# Patient Record
Sex: FEMALE | Race: WHITE | NOT HISPANIC OR LATINO | Employment: FULL TIME | URBAN - METROPOLITAN AREA
[De-identification: names, ages, dates, MRNs, and addresses within clinical notes are randomized per-mention and may not be internally consistent; named-entity substitution may affect disease eponyms.]

---

## 2017-06-16 ENCOUNTER — HOSPITAL ENCOUNTER (OUTPATIENT)
Dept: MAMMOGRAPHY | Facility: HOSPITAL | Age: 57
Discharge: HOME | End: 2017-06-16
Attending: FAMILY MEDICINE

## 2017-06-16 DIAGNOSIS — Z12.31 VISIT FOR SCREENING MAMMOGRAM: ICD-10-CM

## 2017-07-17 ENCOUNTER — OFFICE VISIT - HEALTHEAST (OUTPATIENT)
Dept: FAMILY MEDICINE | Facility: CLINIC | Age: 57
End: 2017-07-17

## 2017-07-17 ENCOUNTER — COMMUNICATION - HEALTHEAST (OUTPATIENT)
Dept: FAMILY MEDICINE | Facility: CLINIC | Age: 57
End: 2017-07-17

## 2017-07-17 DIAGNOSIS — M72.2 PLANTAR FASCIITIS, BILATERAL: ICD-10-CM

## 2017-07-17 DIAGNOSIS — S39.012S LUMBAR STRAIN, SEQUELA: ICD-10-CM

## 2017-07-17 DIAGNOSIS — I83.813 VARICOSE VEINS OF BILATERAL LOWER EXTREMITIES WITH PAIN: ICD-10-CM

## 2017-07-17 DIAGNOSIS — E55.9 VITAMIN D DEFICIENCY: ICD-10-CM

## 2017-07-17 DIAGNOSIS — Z00.00 WELL ADULT EXAM: ICD-10-CM

## 2017-07-17 DIAGNOSIS — E66.9 OBESITY (BMI 30-39.9): ICD-10-CM

## 2017-07-17 LAB
CHOLEST SERPL-MCNC: 188 MG/DL
FASTING STATUS PATIENT QL REPORTED: YES
HDLC SERPL-MCNC: 58 MG/DL
LDLC SERPL CALC-MCNC: 112 MG/DL
TRIGL SERPL-MCNC: 91 MG/DL

## 2017-07-17 ASSESSMENT — MIFFLIN-ST. JEOR: SCORE: 1383.28

## 2017-07-24 ENCOUNTER — COMMUNICATION - HEALTHEAST (OUTPATIENT)
Dept: FAMILY MEDICINE | Facility: CLINIC | Age: 57
End: 2017-07-24

## 2017-07-24 ENCOUNTER — OFFICE VISIT - HEALTHEAST (OUTPATIENT)
Dept: FAMILY MEDICINE | Facility: CLINIC | Age: 57
End: 2017-07-24

## 2017-07-24 ENCOUNTER — COMMUNICATION - HEALTHEAST (OUTPATIENT)
Dept: TELEHEALTH | Facility: CLINIC | Age: 57
End: 2017-07-24

## 2017-07-24 DIAGNOSIS — L03.115 CELLULITIS OF RIGHT THIGH: ICD-10-CM

## 2017-07-24 DIAGNOSIS — T63.441A BEE STING: ICD-10-CM

## 2018-06-28 ENCOUNTER — COMMUNICATION - HEALTHEAST (OUTPATIENT)
Dept: SCHEDULING | Facility: CLINIC | Age: 58
End: 2018-06-28

## 2018-06-28 ENCOUNTER — OFFICE VISIT - HEALTHEAST (OUTPATIENT)
Dept: FAMILY MEDICINE | Facility: CLINIC | Age: 58
End: 2018-06-28

## 2018-06-28 DIAGNOSIS — L03.90 CELLULITIS: ICD-10-CM

## 2018-06-29 ENCOUNTER — HOSPITAL ENCOUNTER (OUTPATIENT)
Dept: MAMMOGRAPHY | Facility: CLINIC | Age: 58
Discharge: HOME | End: 2018-06-29
Attending: FAMILY MEDICINE

## 2018-06-29 DIAGNOSIS — Z12.31 VISIT FOR SCREENING MAMMOGRAM: ICD-10-CM

## 2018-07-13 ENCOUNTER — OFFICE VISIT - HEALTHEAST (OUTPATIENT)
Dept: FAMILY MEDICINE | Facility: CLINIC | Age: 58
End: 2018-07-13

## 2018-07-13 DIAGNOSIS — M67.442 GANGLION CYST OF JOINT OF FINGER OF LEFT HAND: ICD-10-CM

## 2018-07-13 DIAGNOSIS — N32.81 OVERACTIVE BLADDER: ICD-10-CM

## 2018-07-13 DIAGNOSIS — R35.0 URINARY FREQUENCY: ICD-10-CM

## 2018-07-13 DIAGNOSIS — M19.90 OA (OSTEOARTHRITIS): ICD-10-CM

## 2018-07-13 LAB
ALBUMIN UR-MCNC: NEGATIVE MG/DL
APPEARANCE UR: CLEAR
BACTERIA #/AREA URNS HPF: ABNORMAL HPF
BILIRUB UR QL STRIP: NEGATIVE
COLOR UR AUTO: YELLOW
GLUCOSE UR STRIP-MCNC: NEGATIVE MG/DL
HGB UR QL STRIP: NEGATIVE
KETONES UR STRIP-MCNC: NEGATIVE MG/DL
LEUKOCYTE ESTERASE UR QL STRIP: ABNORMAL
NITRATE UR QL: NEGATIVE
PH UR STRIP: 7 [PH] (ref 5–8)
RBC #/AREA URNS AUTO: ABNORMAL HPF
SP GR UR STRIP: 1.01 (ref 1–1.03)
SQUAMOUS #/AREA URNS AUTO: ABNORMAL LPF
UROBILINOGEN UR STRIP-ACNC: ABNORMAL
WBC #/AREA URNS AUTO: ABNORMAL HPF

## 2018-07-13 RX ORDER — NAPROXEN 500 MG/1
500 TABLET ORAL 2 TIMES DAILY PRN
Qty: 60 TABLET | Refills: 4 | Status: SHIPPED | OUTPATIENT
Start: 2018-07-13 | End: 2022-10-17

## 2018-07-13 ASSESSMENT — MIFFLIN-ST. JEOR: SCORE: 1479.96

## 2018-07-14 LAB — BACTERIA SPEC CULT: NO GROWTH

## 2018-07-30 ENCOUNTER — RECORDS - HEALTHEAST (OUTPATIENT)
Dept: ADMINISTRATIVE | Facility: OTHER | Age: 58
End: 2018-07-30

## 2018-08-06 ENCOUNTER — OFFICE VISIT - HEALTHEAST (OUTPATIENT)
Dept: FAMILY MEDICINE | Facility: CLINIC | Age: 58
End: 2018-08-06

## 2018-08-06 DIAGNOSIS — M67.442 GANGLION CYST OF JOINT OF FINGER OF LEFT HAND: ICD-10-CM

## 2018-08-06 DIAGNOSIS — I83.93 VARICOSE VEINS OF BOTH LOWER EXTREMITIES: ICD-10-CM

## 2018-08-06 DIAGNOSIS — E66.811 OBESITY (BMI 30.0-34.9): ICD-10-CM

## 2018-08-06 DIAGNOSIS — R53.83 FATIGUE: ICD-10-CM

## 2018-08-06 DIAGNOSIS — Z00.00 HEALTH CARE MAINTENANCE: ICD-10-CM

## 2018-08-06 DIAGNOSIS — N32.81 OVERACTIVE BLADDER: ICD-10-CM

## 2018-08-06 LAB
ALBUMIN SERPL-MCNC: 3.9 G/DL (ref 3.5–5)
ALP SERPL-CCNC: 76 U/L (ref 45–120)
ALT SERPL W P-5'-P-CCNC: 17 U/L (ref 0–45)
ANION GAP SERPL CALCULATED.3IONS-SCNC: 9 MMOL/L (ref 5–18)
AST SERPL W P-5'-P-CCNC: 23 U/L (ref 0–40)
BILIRUB SERPL-MCNC: 1.3 MG/DL (ref 0–1)
BUN SERPL-MCNC: 22 MG/DL (ref 8–22)
CALCIUM SERPL-MCNC: 9.4 MG/DL (ref 8.5–10.5)
CHLORIDE BLD-SCNC: 105 MMOL/L (ref 98–107)
CHOLEST SERPL-MCNC: 204 MG/DL
CO2 SERPL-SCNC: 27 MMOL/L (ref 22–31)
CREAT SERPL-MCNC: 0.76 MG/DL (ref 0.6–1.1)
ERYTHROCYTE [DISTWIDTH] IN BLOOD BY AUTOMATED COUNT: 12.9 % (ref 11–14.5)
FASTING STATUS PATIENT QL REPORTED: YES
GFR SERPL CREATININE-BSD FRML MDRD: >60 ML/MIN/1.73M2
GLUCOSE BLD-MCNC: 86 MG/DL (ref 70–125)
HCT VFR BLD AUTO: 39.1 % (ref 35–47)
HDLC SERPL-MCNC: 61 MG/DL
HGB BLD-MCNC: 13.7 G/DL (ref 12–16)
LDLC SERPL CALC-MCNC: 128 MG/DL
MCH RBC QN AUTO: 30.6 PG (ref 27–34)
MCHC RBC AUTO-ENTMCNC: 35.1 G/DL (ref 32–36)
MCV RBC AUTO: 87 FL (ref 80–100)
PLATELET # BLD AUTO: 194 THOU/UL (ref 140–440)
PMV BLD AUTO: 8.5 FL (ref 7–10)
POTASSIUM BLD-SCNC: 4.7 MMOL/L (ref 3.5–5)
PROT SERPL-MCNC: 7 G/DL (ref 6–8)
RBC # BLD AUTO: 4.48 MILL/UL (ref 3.8–5.4)
SODIUM SERPL-SCNC: 141 MMOL/L (ref 136–145)
TRIGL SERPL-MCNC: 74 MG/DL
WBC: 4.7 THOU/UL (ref 4–11)

## 2018-08-06 ASSESSMENT — MIFFLIN-ST. JEOR: SCORE: 1506.04

## 2018-08-30 ENCOUNTER — RECORDS - HEALTHEAST (OUTPATIENT)
Dept: ADMINISTRATIVE | Facility: OTHER | Age: 58
End: 2018-08-30

## 2019-06-17 ENCOUNTER — OFFICE VISIT - HEALTHEAST (OUTPATIENT)
Dept: FAMILY MEDICINE | Facility: CLINIC | Age: 59
End: 2019-06-17

## 2019-06-17 DIAGNOSIS — B07.0 PLANTAR WARTS: ICD-10-CM

## 2019-06-17 DIAGNOSIS — I83.813 VARICOSE VEINS OF BOTH LOWER EXTREMITIES WITH PAIN: ICD-10-CM

## 2019-06-17 ASSESSMENT — MIFFLIN-ST. JEOR: SCORE: 1580.31

## 2019-07-08 ENCOUNTER — COMMUNICATION - HEALTHEAST (OUTPATIENT)
Dept: VASCULAR SURGERY | Facility: CLINIC | Age: 59
End: 2019-07-08

## 2019-07-09 ENCOUNTER — OFFICE VISIT - HEALTHEAST (OUTPATIENT)
Dept: VASCULAR SURGERY | Facility: CLINIC | Age: 59
End: 2019-07-09

## 2019-07-09 ENCOUNTER — RECORDS - HEALTHEAST (OUTPATIENT)
Dept: VASCULAR ULTRASOUND | Facility: CLINIC | Age: 59
End: 2019-07-09

## 2019-07-09 DIAGNOSIS — I87.2 VENOUS INSUFFICIENCY OF BOTH LOWER EXTREMITIES: ICD-10-CM

## 2019-07-09 DIAGNOSIS — I87.2 VENOUS INSUFFICIENCY (CHRONIC) (PERIPHERAL): ICD-10-CM

## 2019-07-09 DIAGNOSIS — I83.893 SYMPTOMATIC VARICOSE VEINS OF BOTH LOWER EXTREMITIES: ICD-10-CM

## 2019-07-09 DIAGNOSIS — I83.893 VARICOSE VEINS OF BILATERAL LOWER EXTREMITIES WITH OTHER COMPLICATIONS: ICD-10-CM

## 2019-07-09 ASSESSMENT — MIFFLIN-ST. JEOR: SCORE: 1544.3

## 2019-07-31 ENCOUNTER — COMMUNICATION - HEALTHEAST (OUTPATIENT)
Dept: FAMILY MEDICINE | Facility: CLINIC | Age: 59
End: 2019-07-31

## 2019-07-31 DIAGNOSIS — N32.81 OVERACTIVE BLADDER: ICD-10-CM

## 2019-08-22 ENCOUNTER — HOSPITAL ENCOUNTER (OUTPATIENT)
Dept: MAMMOGRAPHY | Facility: CLINIC | Age: 59
Discharge: HOME | End: 2019-08-22
Attending: FAMILY MEDICINE

## 2019-08-22 DIAGNOSIS — Z12.31 VISIT FOR SCREENING MAMMOGRAM: ICD-10-CM

## 2019-08-23 ENCOUNTER — OFFICE VISIT - HEALTHEAST (OUTPATIENT)
Dept: FAMILY MEDICINE | Facility: CLINIC | Age: 59
End: 2019-08-23

## 2019-08-23 DIAGNOSIS — E66.09 CLASS 2 OBESITY DUE TO EXCESS CALORIES WITHOUT SERIOUS COMORBIDITY WITH BODY MASS INDEX (BMI) OF 36.0 TO 36.9 IN ADULT: ICD-10-CM

## 2019-08-23 DIAGNOSIS — Z00.00 ROUTINE GENERAL MEDICAL EXAMINATION AT A HEALTH CARE FACILITY: ICD-10-CM

## 2019-08-23 DIAGNOSIS — Z11.4 ENCOUNTER FOR SCREENING FOR HIV: ICD-10-CM

## 2019-08-23 DIAGNOSIS — N32.81 OVERACTIVE BLADDER: ICD-10-CM

## 2019-08-23 DIAGNOSIS — Z11.59 NEED FOR HEPATITIS C SCREENING TEST: ICD-10-CM

## 2019-08-23 DIAGNOSIS — E66.812 CLASS 2 OBESITY DUE TO EXCESS CALORIES WITHOUT SERIOUS COMORBIDITY WITH BODY MASS INDEX (BMI) OF 36.0 TO 36.9 IN ADULT: ICD-10-CM

## 2019-08-23 LAB
CHOLEST SERPL-MCNC: 191 MG/DL
FASTING STATUS PATIENT QL REPORTED: YES
FASTING STATUS PATIENT QL REPORTED: YES
GLUCOSE BLD-MCNC: 96 MG/DL (ref 70–125)
HDLC SERPL-MCNC: 64 MG/DL
HGB BLD-MCNC: 15.1 G/DL (ref 12–16)
HIV 1+2 AB+HIV1 P24 AG SERPL QL IA: NEGATIVE
LDLC SERPL CALC-MCNC: 117 MG/DL
TRIGL SERPL-MCNC: 49 MG/DL
TSH SERPL DL<=0.005 MIU/L-ACNC: 1.04 UIU/ML (ref 0.3–5)

## 2019-08-23 ASSESSMENT — MIFFLIN-ST. JEOR: SCORE: 1571.81

## 2019-08-26 LAB — HCV AB SERPL QL IA: NEGATIVE

## 2019-11-26 ENCOUNTER — OFFICE VISIT - HEALTHEAST (OUTPATIENT)
Dept: VASCULAR SURGERY | Facility: CLINIC | Age: 59
End: 2019-11-26

## 2019-11-26 DIAGNOSIS — I87.2 VENOUS INSUFFICIENCY OF BOTH LOWER EXTREMITIES: ICD-10-CM

## 2019-11-26 DIAGNOSIS — I83.893 SYMPTOMATIC VARICOSE VEINS OF BOTH LOWER EXTREMITIES: ICD-10-CM

## 2019-11-26 ASSESSMENT — MIFFLIN-ST. JEOR: SCORE: 1560.19

## 2020-06-18 ENCOUNTER — COMMUNICATION - HEALTHEAST (OUTPATIENT)
Dept: SCHEDULING | Facility: CLINIC | Age: 60
End: 2020-06-18

## 2020-06-18 ENCOUNTER — OFFICE VISIT - HEALTHEAST (OUTPATIENT)
Dept: FAMILY MEDICINE | Facility: CLINIC | Age: 60
End: 2020-06-18

## 2020-06-18 ENCOUNTER — AMBULATORY - HEALTHEAST (OUTPATIENT)
Dept: VASCULAR SURGERY | Facility: CLINIC | Age: 60
End: 2020-06-18

## 2020-06-18 DIAGNOSIS — L03.011 CELLULITIS OF FINGER OF RIGHT HAND: ICD-10-CM

## 2020-08-07 ENCOUNTER — COMMUNICATION - HEALTHEAST (OUTPATIENT)
Dept: VASCULAR SURGERY | Facility: CLINIC | Age: 60
End: 2020-08-07

## 2020-08-25 ENCOUNTER — COMMUNICATION - HEALTHEAST (OUTPATIENT)
Dept: VASCULAR SURGERY | Facility: CLINIC | Age: 60
End: 2020-08-25

## 2020-09-02 ENCOUNTER — RECORDS - HEALTHEAST (OUTPATIENT)
Dept: VASCULAR ULTRASOUND | Facility: CLINIC | Age: 60
End: 2020-09-02

## 2020-09-02 DIAGNOSIS — I87.2 VENOUS INSUFFICIENCY (CHRONIC) (PERIPHERAL): ICD-10-CM

## 2020-09-02 DIAGNOSIS — I83.893 VARICOSE VEINS OF BILATERAL LOWER EXTREMITIES WITH OTHER COMPLICATIONS: ICD-10-CM

## 2020-09-02 RX ORDER — ASCORBIC ACID 500 MG
500 TABLET ORAL DAILY
Status: SHIPPED | COMMUNITY
Start: 2020-09-02

## 2020-09-03 ENCOUNTER — COMMUNICATION - HEALTHEAST (OUTPATIENT)
Dept: FAMILY MEDICINE | Facility: CLINIC | Age: 60
End: 2020-09-03

## 2020-09-04 ENCOUNTER — RECORDS - HEALTHEAST (OUTPATIENT)
Dept: VASCULAR ULTRASOUND | Facility: CLINIC | Age: 60
End: 2020-09-04

## 2020-09-04 DIAGNOSIS — I87.2 VENOUS INSUFFICIENCY (CHRONIC) (PERIPHERAL): ICD-10-CM

## 2020-09-04 DIAGNOSIS — I83.893 VARICOSE VEINS OF BILATERAL LOWER EXTREMITIES WITH OTHER COMPLICATIONS: ICD-10-CM

## 2020-09-16 ENCOUNTER — COMMUNICATION - HEALTHEAST (OUTPATIENT)
Dept: VASCULAR SURGERY | Facility: CLINIC | Age: 60
End: 2020-09-16

## 2020-09-16 ENCOUNTER — OFFICE VISIT - HEALTHEAST (OUTPATIENT)
Dept: FAMILY MEDICINE | Facility: CLINIC | Age: 60
End: 2020-09-16

## 2020-09-16 ENCOUNTER — OFFICE VISIT - HEALTHEAST (OUTPATIENT)
Dept: VASCULAR SURGERY | Facility: CLINIC | Age: 60
End: 2020-09-16

## 2020-09-16 DIAGNOSIS — E66.3 OVERWEIGHT: ICD-10-CM

## 2020-09-16 DIAGNOSIS — I83.893 SYMPTOMATIC VARICOSE VEINS OF BOTH LOWER EXTREMITIES: ICD-10-CM

## 2020-09-16 DIAGNOSIS — I10 BENIGN ESSENTIAL HYPERTENSION: ICD-10-CM

## 2020-09-16 RX ORDER — LISINOPRIL 10 MG/1
10 TABLET ORAL DAILY
Qty: 90 TABLET | Refills: 3 | Status: SHIPPED | OUTPATIENT
Start: 2020-09-16 | End: 2021-09-06

## 2020-10-05 ENCOUNTER — AMBULATORY - HEALTHEAST (OUTPATIENT)
Dept: LAB | Facility: CLINIC | Age: 60
End: 2020-10-05

## 2020-10-05 DIAGNOSIS — E66.3 OVERWEIGHT: ICD-10-CM

## 2020-10-05 DIAGNOSIS — I10 BENIGN ESSENTIAL HYPERTENSION: ICD-10-CM

## 2020-10-05 LAB
ALBUMIN SERPL-MCNC: 4 G/DL (ref 3.5–5)
ALP SERPL-CCNC: 93 U/L (ref 45–120)
ALT SERPL W P-5'-P-CCNC: 17 U/L (ref 0–45)
ANION GAP SERPL CALCULATED.3IONS-SCNC: 7 MMOL/L (ref 5–18)
AST SERPL W P-5'-P-CCNC: 22 U/L (ref 0–40)
BILIRUB SERPL-MCNC: 1.1 MG/DL (ref 0–1)
BUN SERPL-MCNC: 19 MG/DL (ref 8–22)
CALCIUM SERPL-MCNC: 9.2 MG/DL (ref 8.5–10.5)
CHLORIDE BLD-SCNC: 104 MMOL/L (ref 98–107)
CHOLEST SERPL-MCNC: 199 MG/DL
CO2 SERPL-SCNC: 29 MMOL/L (ref 22–31)
CREAT SERPL-MCNC: 0.8 MG/DL (ref 0.6–1.1)
FASTING STATUS PATIENT QL REPORTED: YES
GFR SERPL CREATININE-BSD FRML MDRD: >60 ML/MIN/1.73M2
GLUCOSE BLD-MCNC: 95 MG/DL (ref 70–125)
HDLC SERPL-MCNC: 65 MG/DL
HGB BLD-MCNC: 13.6 G/DL (ref 12–16)
LDLC SERPL CALC-MCNC: 119 MG/DL
POTASSIUM BLD-SCNC: 4.7 MMOL/L (ref 3.5–5)
PROT SERPL-MCNC: 7.2 G/DL (ref 6–8)
SODIUM SERPL-SCNC: 140 MMOL/L (ref 136–145)
TRIGL SERPL-MCNC: 73 MG/DL
TSH SERPL DL<=0.005 MIU/L-ACNC: 1.65 UIU/ML (ref 0.3–5)

## 2020-10-07 ENCOUNTER — COMMUNICATION - HEALTHEAST (OUTPATIENT)
Dept: FAMILY MEDICINE | Facility: CLINIC | Age: 60
End: 2020-10-07

## 2020-10-07 ENCOUNTER — RECORDS - HEALTHEAST (OUTPATIENT)
Dept: ADMINISTRATIVE | Facility: OTHER | Age: 60
End: 2020-10-07

## 2021-01-18 ENCOUNTER — COMMUNICATION - HEALTHEAST (OUTPATIENT)
Dept: VASCULAR SURGERY | Facility: CLINIC | Age: 61
End: 2021-01-18

## 2021-01-20 ENCOUNTER — OFFICE VISIT - HEALTHEAST (OUTPATIENT)
Dept: VASCULAR SURGERY | Facility: CLINIC | Age: 61
End: 2021-01-20

## 2021-01-20 DIAGNOSIS — I87.2 VENOUS INSUFFICIENCY OF BOTH LOWER EXTREMITIES: ICD-10-CM

## 2021-01-20 DIAGNOSIS — I83.893 SYMPTOMATIC VARICOSE VEINS OF BOTH LOWER EXTREMITIES: ICD-10-CM

## 2021-05-26 ENCOUNTER — RECORDS - HEALTHEAST (OUTPATIENT)
Dept: ADMINISTRATIVE | Facility: CLINIC | Age: 61
End: 2021-05-26

## 2021-05-27 ENCOUNTER — RECORDS - HEALTHEAST (OUTPATIENT)
Dept: ADMINISTRATIVE | Facility: CLINIC | Age: 61
End: 2021-05-27

## 2021-05-28 ENCOUNTER — RECORDS - HEALTHEAST (OUTPATIENT)
Dept: ADMINISTRATIVE | Facility: CLINIC | Age: 61
End: 2021-05-28

## 2021-05-29 NOTE — PATIENT INSTRUCTIONS - HE
Consider OTC wart remover cream once wounds heal from freezing today.    Can follow up in 4 weeks for repeat freezing if needed

## 2021-05-29 NOTE — PROGRESS NOTES
Assessment/Plan:     1. Plantar warts     2. Varicose veins of both lower extremities with pain  Ambulatory referral to Vascular Surgery       Diagnoses and all orders for this visit:    Plantar warts  Patient would like treatment of warts today with liquid nitrogen.  Discussed procedure for treatment.  Discussed risks associated with procedure including those of discomfort and infection.  Verbal consent obtained.  Warts were cleansed with alcohol swab.  Warts were pared down with a #15 scalpel.  Each wart was then frozen for 20 seconds with liquid nitrogen, allowed to fall and then frozen again for 20 seconds.  She tolerated procedure well.  Counseled on wound cares.  Bandages placed over wounds.  She will follow-up in 4 weeks for repeat treatment if needed.  Also discussed use of over-the-counter wart removing creams in interim as this may help with resolution of lesions.    Varicose veins of both lower extremities with pain  -     Ambulatory referral to Vascular Surgery         Subjective:      Shahnaz Arias is a 59 y.o. female who comes in today with concern about a wart on her left foot.  This has been present for about 3 weeks.  It is causing her pain and discomfort.  She initially thought it was a callus.  When she has had calluses in the past she will soak her feet and file them down and they will no longer be a problem.  She has been soaking her foot and filing the current lesion down but this does not relieve the discomfort.  She has had warts in the past.  We reviewed meds and allergies.  She has no other concerns or questions.  She does request a referral to the vascular center.  She has history of varicose veins of both legs.  These are causing discomfort.  In the past we have discussed that she could return to vascular center for further evaluation and treatment.  She is now ready to pursue this referral.  Review of systems otherwise negative.  No other questions today.    Current Outpatient  Medications   Medication Sig Dispense Refill     calcium carbonate-vitamin D3 (CALCIUM 600 WITH VITAMIN D3) 600 mg(1,500mg) -200 unit per tablet Take 1 tablet by mouth daily.       cholecalciferol, vitamin D3, (VITAMIN D3) 2,000 unit cap Take by mouth daily.       coenzyme Q10 (CO Q-10) 100 mg capsule Take 100 mg by mouth daily. Take as directed.       cranberry 400 mg cap Take 1 tablet by mouth daily.       omega-3 fatty acids-vitamin E (FISH OIL) 1,000 mg cap Take by mouth daily.       tolterodine (DETROL LA) 4 MG ER capsule Take 1 capsule (4 mg total) by mouth daily. 90 capsule 3     naproxen (NAPROSYN) 500 MG tablet Take 1 tablet (500 mg total) by mouth 2 (two) times a day as needed. 60 tablet 4     No current facility-administered medications for this visit.        Past Medical History, Family History, and Social History reviewed.  No past medical history on file.  Past Surgical History:   Procedure Laterality Date     HYSTERECTOMY  2002     DC TOTAL ABDOM HYSTERECTOMY      Description: Total Abdominal Hysterectomy;  Recorded: 03/28/2013;     Sulfa (sulfonamide antibiotics)  Family History   Problem Relation Age of Onset     Breast cancer Maternal Aunt      Dementia Maternal Aunt         Alzheimers     Breast cancer Maternal Aunt      Dementia Mother         Alzheimer     Dementia Maternal Aunt         Alzheimers     Social History     Socioeconomic History     Marital status:      Spouse name: Not on file     Number of children: Not on file     Years of education: Not on file     Highest education level: Not on file   Occupational History     Not on file   Social Needs     Financial resource strain: Not on file     Food insecurity:     Worry: Not on file     Inability: Not on file     Transportation needs:     Medical: Not on file     Non-medical: Not on file   Tobacco Use     Smoking status: Never Smoker     Smokeless tobacco: Never Used   Substance and Sexual Activity     Alcohol use: No     Drug use:  "Not on file     Sexual activity: Yes     Partners: Male     Birth control/protection: None   Lifestyle     Physical activity:     Days per week: Not on file     Minutes per session: Not on file     Stress: Not on file   Relationships     Social connections:     Talks on phone: Not on file     Gets together: Not on file     Attends Gnosticism service: Not on file     Active member of club or organization: Not on file     Attends meetings of clubs or organizations: Not on file     Relationship status: Not on file     Intimate partner violence:     Fear of current or ex partner: Not on file     Emotionally abused: Not on file     Physically abused: Not on file     Forced sexual activity: Not on file   Other Topics Concern     Not on file   Social History Narrative     Not on file         Review of systems is as stated in HPI, and the remainder of the 10 system review is otherwise negative.    Objective:     Vitals:    06/17/19 1459   BP: 142/70   Patient Site: Right Arm   Patient Position: Sitting   Cuff Size: Adult Large   Pulse: 81   Temp: 98.4  F (36.9  C)   TempSrc: Oral   SpO2: 99%   Weight: 221 lb 7 oz (100.4 kg)   Height: 5' 5\" (1.651 m)    Body mass index is 36.85 kg/m .    General appearance: alert, appears stated age and cooperative  Head: Normocephalic, without obvious abnormality, atraumatic  Skin: 2 plantar warts present on left foot      This note has been dictated using voice recognition software. Any grammatical or context distortions are unintentional and inherent to the the software.       "

## 2021-05-30 NOTE — PROGRESS NOTES
[facility] Vein Consult      Assessment:     1. varicose veins, bilateral   2. spider veins, bilateral   3. Insuffiencey of right acessory saphenous vein, left acessory saphenous vein       Plan:     1. Treatment options of conservative therapy of stockings use, exercise, weight loss,  elevating legs when possible.    2. Script for compression stockings 20-30 mm hg  3. Ultrasound to evaluate legs for incompetency of both deep and superficial system . Done today  4. Surgical treatment   Endovenous closure ofbilateral, acessory saphenous vein   Stab avulsions of bilateral lower extremities   Risks and benefits of surgical intervention including infection, burns, dvt, thrombophlebitis, not closing, recurrence, numbness and nerve injury and need for further intervention were all discused    5. Follow up: 3 months.   6. Call for any questions concerns or issues    Subjective:      Shahnaz Arias is a 59 y.o. female  who was referred by Cyn Garrido MD  for evaluation of varicose veins. Symptoms include pain, aching, fatigue, burning, edema and dermatitis. Patient has history of leg selling, pain and vein issues that have progressed. Pain and symptoms have affected daily living and work activities needing medications. Here for evaluation today. no stocking or compression devic use    Allergies:Sulfa (sulfonamide antibiotics)    History reviewed. No pertinent past medical history.    Past Surgical History:   Procedure Laterality Date     HYSTERECTOMY  2002     MT TOTAL ABDOM HYSTERECTOMY      Description: Total Abdominal Hysterectomy;  Recorded: 03/28/2013;     VEIN LIGATION AND STRIPPING Bilateral        Current Outpatient Medications   Medication Sig Note     calcium carbonate-vitamin D3 (CALCIUM 600 WITH VITAMIN D3) 600 mg(1,500mg) -200 unit per tablet Take 1 tablet by mouth daily.      cholecalciferol, vitamin D3, (VITAMIN D3) 2,000 unit cap Take by mouth daily.      coenzyme Q10 (CO Q-10) 100 mg capsule Take 100 mg  "by mouth daily. Take as directed.      cranberry 400 mg cap Take 1 tablet by mouth daily.      naproxen (NAPROSYN) 500 MG tablet Take 1 tablet (500 mg total) by mouth 2 (two) times a day as needed. 6/17/2019: As needed      omega-3 fatty acids-vitamin E (FISH OIL) 1,000 mg cap Take by mouth daily.      tolterodine (DETROL LA) 4 MG ER capsule Take 1 capsule (4 mg total) by mouth daily.        Family History   Problem Relation Age of Onset     Breast cancer Maternal Aunt      Dementia Maternal Aunt         Alzheimers     Breast cancer Maternal Aunt      Dementia Mother         Alzheimer     Varicose Veins Mother      Dementia Maternal Aunt         Alzheimers     Varicose Veins Sister      Varicose Veins Brother         reports that she has never smoked. She has never used smokeless tobacco. She reports that she does not drink alcohol.      Review of Systems  Pertinent items are noted in HPI.  A 12 point comprehensive review of systems was negative except as noted.      Objective:     Vitals:    07/09/19 1434   BP: 134/86   Pulse: 92   Resp: 16   Temp: 98.2  F (36.8  C)   TempSrc: Oral   Weight: 210 lb (95.3 kg)   Height: 5' 6\" (1.676 m)     Body mass index is 33.89 kg/m .    EXAM:  GENERAL: This is a well-developed 59 y.o. female who appears her stated age  HEAD: normocephalic  HEENT: Pupils equal and reactive bilaterally  MOUTH: mucus membranes intact. Normal dentation  CARDIAC: RRR without murmur  CHEST/LUNG:  Clear to auscultation bilaterally  ABDOMEN: Soft, nontender, nondistended, no masses noted   NEUROLOGIC: Focally intact, nonfocal, alert and oriented x 3  INTEGUMENT: No open lesions or ulcers  VASCULAR: Pulses intact, symmetrical upper and lower extremities. There areskin changes consistent with chronic venous insufficiency. Varicose veins present in bilateral greater saphenous distribution. Spider veins present bilateral.    Imaging:    Insuffiencey of bilateral anterior accessory saphenous veins. Stripped " or missing greater saphenous veins.   Preliminary results full to follow    US Venous Insufficiency Legs Bilateral (Order [id number])   Imaging   Date: 7/9/2019 Department: [facility] Ultrasound [place] Released By: Medhat Ron RDMS, RVT Authorizing: Hai Prater MD   Study Result     [place] [facility]     DATE: 7/9/2019     EXAM: BILATERAL LOWER EXTREMITY DEEP AND SUPERFICIAL VENOUS DUPLEX ULTRASOUND WITH PHYSIOLOGIC TESTING     INDICATION: Symptomatic varicose veins. Assess for incompetent veins.     TECHNIQUE: Supine and upright ultrasound of the deep and superficial veins with Valsalva and compression augmentation maneuvers. Duplex imaging is performed utilizing gray-scale, two-dimensional images, color-flow imaging, Doppler waveform analysis, and   spectral Doppler imaging.      INCOMPETENCY CRITERIA: Deep vein reflux reported when greater than 1,000 ms flow reversal.  Superficial vein reflux reported when greater than 500 ms flow reversal.  vein reflux reported as greater than 350 ms flow reversal.     DEEP VEIN FINDINGS:     RIGHT LEG: The common femoral, profunda femoral, femoral, popliteal, and visualized calf veins are patent and compressible.  Deep venous reflux noted within the common femoral, upper femoral, and popliteal veins.     LEFT LEG:  The common femoral, profunda femoral, femoral, popliteal, and visualized calf veins are patent and compressible.   Deep venous reflux noted within the common femoral and upper femoral veins.     RIGHT SUPERFICIAL VEIN FINDINGS:  GREAT SAPHENOUS VEIN: Nonvisualization of portions of the greater saphenous vein. Superficial venous reflux noted within the saphenofemoral junction. The vein measures 8 mm within this distribution.     SMALL SAPHENOUS VEIN: Superficial venous reflux within the small saphenous vein at the level of the proximal calf. The vein measures 5 mm within this distribution.     The anterior accessory  saphenous vein is incompetent measuring 8 mm in diameter. A cluster of varicose veins is noted within the right thigh measuring 6 mm in diameter with reflux noted.     LEFT SUPERFICIAL VEIN FINDINGS:  GREAT SAPHENOUS VEIN: Portions of the greater saphenous vein are not well visualized. Superficial venous reflux at the level the saphenofemoral junction. The vein measures 6 mm within this distribution.     SMALL SAPHENOUS VEIN: Superficial venous reflux within the small saphenous vein at the level the proximal calf. The vein measures 5 mm within this distribution.     The anterior accessory saphenous vein is incompetent measuring 4-6 mm. A varicose vein branch measures approximately 7 mm in diameter and is incompetent, located within the proximal thigh.     IMPRESSION:   CONCLUSION:   1.  No deep venous thrombosis of either lower extremity.  2.  RIGHT LEG: Deep and superficial venous reflux, as described.  3.  LEFT LEG: Deep and superficial venous reflux, as described.         Hai Prater MD  [facility] Dept.

## 2021-05-30 NOTE — PROGRESS NOTES
This is a new consult for Varicose Veins. The patient has varicose veins that are problematic in bilateral legs. Symptoms patient has been experiencing are heaviness and aching. Pain rated 8 out of 10. Patient has not been wearing compression stockings. Patient has not had recent imaging on legs done. Hx of varicose vein stripping done bilaterally around 1990.

## 2021-05-31 VITALS — HEIGHT: 65 IN | BODY MASS INDEX: 29.66 KG/M2 | WEIGHT: 178 LBS

## 2021-05-31 VITALS — WEIGHT: 180.5 LBS | BODY MASS INDEX: 30.04 KG/M2

## 2021-05-31 NOTE — TELEPHONE ENCOUNTER
RN cannot approve Refill Request    RN can NOT refill this medication med is not covered by policy/route to provider. Last office visit: 6/17/2019 Cyn Garrido MD Last Physical: 8/6/2018 Last MTM visit: Visit date not found Last visit same specialty: 6/17/2019 Cyn Garrido MD.  Next visit within 3 mo: Visit date not found  Next physical within 3 mo: Visit date not found      Nely Jefferson, [facility] Triage/Med Refill 7/31/2019    Requested Prescriptions   Pending Prescriptions Disp Refills     tolterodine (DETROL LA) 4 MG ER capsule [Pharmacy Med Name: Tolterodine Tartrate ER Oral Capsule Extended Release 24 Hour 4 MG] 90 capsule 2     Sig: TAKE ONE CAPSULE BY MOUTH ONE TIME DAILY       There is no refill protocol information for this order

## 2021-05-31 NOTE — PROGRESS NOTES
Assessment:        1. Routine general medical examination at a health care facility  Lipid Cascade FASTING    Glucose    Hemoglobin   2. Overactive bladder  oxybutynin (DITROPAN XL) 5 MG ER tablet   3. Encounter for screening for HIV  HIV Antigen/Antibody Screening [place]   4. Need for hepatitis C screening test  Hepatitis C Antibody (Anti-HCV)   5. Class 2 obesity due to excess calories without serious comorbidity with body mass index (BMI) of 36.0 to 36.9 in adult  Thyroid Stimulating Hormone (TSH)       Plan:      1. Healthcare maintenance: She is up-to-date on mammogram and colonoscopy.  Does not need Paps that she had hysterectomy for benign reasons.  Check fasting lipids and glucose as well as hemoglobin today.  Encouraged regular exercise, healthy diet and weight loss efforts.  HIV and hepatitis C screening test will be performed today as well.  Discussed the new shingles vaccine and she will check with insurance on coverage  2. Overactive bladder: Discontinue Detrol LA.  Will initiate trial of oxybutynin 5 mg daily.  Counseled on use of medication.  Can titrate upward as needed to achieve desired effect  3. Obesity: Check TSH, lipids and glucose.  Encouraged regular exercise, healthy diet and weight loss.        The following high BMI interventions were performed this visit: encouragement to exercise    Subjective:      Shahnaz Arias is a 59 y.o. female who presents for an annual exam.  Reviewed her health history.  Remains in good health overall.  History of varicose veins.  Wearing compression stockings.  Working with vascular center currently.  Will need surgery.  Follow-up planned in October.  Currently on Detrol LA for overactive bladder.  Feels that medication is not as effective as it was when she first started it.  Also concerned about recently reading information that can be associated with dementia.  There is family history of dementia.  She would like to try alternative options.  She  continues to struggle with some constipation.  Bowel movements more regular if she eats more fruits and vegetables.  She has gained some weight over the past year but attributes that to an issue with 1 of her coworkers.  That issue is hopefully resolved.  She continues weight watchers and she exercises regularly.  Will be heading back to school next week.  Reviewed family history, medications, allergies and social history.  She is up-to-date on mammogram and colonoscopy.  She is due for tetanus vaccine today.  Gets regular vision and dental exams.  Review of systems assessed and otherwise negative.  No other questions today.    Healthy Habits:   Regular Exercise: Yes--walks dog  Sunscreen Use: Yes  Healthy Diet: Yes  Dental Visits Regularly: Yes  Seat Belt: Yes  Sexually active: Yes  Self Breast Exam Monthly:Yes  Hemoccults: N/A  Flex Sig: N/A  Colonoscopy: Yes  Lipid Profile: Yes  Glucose Screen: Yes  Prevention of Osteoporosis: Yes  Last Dexa: N/A        Immunization History   Administered Date(s) Administered     DT (pediatric) 11/10/1993, 2003     Influenza, inj, historic,unspecified 10/06/2010     Influenza, seasonal,quad inj 6-35 mos 2012, 10/14/2013, 10/01/2014     Td,adult,historic,unspecified 08/10/2009     Tdap 08/10/2009     Immunization status: due today.      Gynecologic History  No LMP recorded. Patient has had a hysterectomy.  Contraception: status post hysterectomy  Last Pap: . Results were: normal  Last mammogram: . Results were: pending      OB History    Para Term  AB Living   2 2 2         SAB TAB Ectopic Multiple Live Births                  # Outcome Date GA Lbr Brayan/2nd Weight Sex Delivery Anes PTL Lv   2 Term            1 Term                Current Outpatient Medications   Medication Sig Dispense Refill     calcium carbonate-vitamin D3 (CALCIUM 600 WITH VITAMIN D3) 600 mg(1,500mg) -200 unit per tablet Take 1 tablet by mouth daily.       cholecalciferol,  vitamin D3, (VITAMIN D3) 2,000 unit cap Take by mouth daily.       coenzyme Q10 (CO Q-10) 100 mg capsule Take 100 mg by mouth daily. Take as directed.       cranberry 400 mg cap Take 1 tablet by mouth daily.       naproxen (NAPROSYN) 500 MG tablet Take 1 tablet (500 mg total) by mouth 2 (two) times a day as needed. 60 tablet 4     omega-3 fatty acids-vitamin E (FISH OIL) 1,000 mg cap Take by mouth daily.       oxybutynin (DITROPAN XL) 5 MG ER tablet Take 1 tablet (5 mg total) by mouth daily. 90 tablet 3     No current facility-administered medications for this visit.      History reviewed. No pertinent past medical history.  Past Surgical History:   Procedure Laterality Date     HYSTERECTOMY  2002     MT TOTAL ABDOM HYSTERECTOMY      Description: Total Abdominal Hysterectomy;  Recorded: 03/28/2013;     VEIN LIGATION AND STRIPPING Bilateral      Sulfa (sulfonamide antibiotics)  Family History   Problem Relation Age of Onset     Breast cancer Maternal Aunt      Dementia Maternal Aunt         Alzheimers     Breast cancer Maternal Aunt      Dementia Mother         Alzheimer     Varicose Veins Mother      Dementia Maternal Aunt         Alzheimers     Varicose Veins Sister      Varicose Veins Brother      Social History     Socioeconomic History     Marital status:      Spouse name: Not on file     Number of children: Not on file     Years of education: Not on file     Highest education level: Not on file   Occupational History     Not on file   Social Needs     Financial resource strain: Not on file     Food insecurity:     Worry: Not on file     Inability: Not on file     Transportation needs:     Medical: Not on file     Non-medical: Not on file   Tobacco Use     Smoking status: Never Smoker     Smokeless tobacco: Never Used   Substance and Sexual Activity     Alcohol use: No     Drug use: Never     Sexual activity: Yes     Partners: Male     Birth control/protection: None   Lifestyle     Physical activity:      "Days per week: Not on file     Minutes per session: Not on file     Stress: Not on file   Relationships     Social connections:     Talks on phone: Not on file     Gets together: Not on file     Attends Nondenominational service: Not on file     Active member of club or organization: Not on file     Attends meetings of clubs or organizations: Not on file     Relationship status: Not on file     Intimate partner violence:     Fear of current or ex partner: Not on file     Emotionally abused: Not on file     Physically abused: Not on file     Forced sexual activity: Not on file   Other Topics Concern     Not on file   Social History Narrative     Not on file       Review of Systems  General:  Denies problem  Eyes: Denies problem  Ears/Nose/Throat: Denies problem  Cardiovascular: Denies problem  Respiratory:  Denies problem  Gastrointestinal:  Denies problem, Genitourinary: Denies problem  Musculoskeletal:  Denies problem  Skin: Denies problem  Neurologic: Denies problem  Psychiatric: Denies problem  Endocrine: Denies problem  Heme/Lymphatic: Denies problem   Allergic/Immunologic: Denies problem        Objective:         /76 (Patient Site: Right Arm, Patient Position: Sitting, Cuff Size: Adult Large)   Pulse 75   Temp 98  F (36.7  C) (Oral)   Ht 5' 5\" (1.651 m)   Wt 219 lb 9 oz (99.6 kg)   SpO2 99%   BMI 36.54 kg/m        Physical Exam:  General Appearance: Alert, cooperative, no distress, appears stated age  Head: Normocephalic, without obvious abnormality, atraumatic  Eyes: PERRL, conjunctiva/corneas clear, EOM's intact  Ears: Normal TM's and external ear canals, both ears  Nose: Nares normal, septum midline,mucosa normal, no drainage  Throat: Lips, mucosa, and tongue normal; teeth and gums normal  Neck: Supple, symmetrical, trachea midline, no adenopathy;  thyroid: not enlarged, symmetric, no tenderness/mass/nodules;  Back: Symmetric, no curvature, ROM normal  Lungs: Clear to auscultation bilaterally, " respirations unlabored  Breasts: No breast masses, tenderness, asymmetry, or nipple discharge.  Heart: Regular rate and rhythm, S1 and S2 normal, no murmur, rub, or gallop, Abdomen: Soft, non-tender, bowel sounds active all four quadrants,  no masses, no organomegaly  Pelvic:Not examined  Extremities: Extremities normal, atraumatic, no cyanosis or edema  Skin: Skin color, texture, turgor normal, no rashes or lesions  Lymph nodes: Cervical, supraclavicular, and axillary nodes normal  Neurologic: Normal

## 2021-06-01 VITALS — HEIGHT: 65 IN | BODY MASS INDEX: 33.21 KG/M2 | WEIGHT: 199.31 LBS

## 2021-06-01 VITALS — WEIGHT: 205.06 LBS | HEIGHT: 65 IN | BODY MASS INDEX: 34.16 KG/M2

## 2021-06-01 VITALS — BODY MASS INDEX: 33.95 KG/M2 | WEIGHT: 204 LBS

## 2021-06-03 VITALS — HEIGHT: 65 IN | WEIGHT: 221.44 LBS | BODY MASS INDEX: 36.89 KG/M2

## 2021-06-03 VITALS — HEIGHT: 66 IN | BODY MASS INDEX: 33.75 KG/M2 | WEIGHT: 210 LBS

## 2021-06-03 VITALS — BODY MASS INDEX: 36.58 KG/M2 | HEIGHT: 65 IN | WEIGHT: 219.56 LBS

## 2021-06-03 NOTE — PATIENT INSTRUCTIONS - HE
"We are prescribing some compression garments for you. Below are some locations where they can help you get measured and fitting to ensure proper fitting. You should wear compression socks as much as you can. It is especially important to wear them with long periods of sitting/standing, long car rides or if you will be flying. Compression socks should get refilled every 4-6 months. They do not need to be worn at night while in bed. We can refill your sock prescription for 1 year otherwise your primary is able to refill them for you. Call us with any problems or questions.If you do a lot of standing it is good to do calf raises to help keep the blood pumping. If you sit a lot at work it is good to get up periodically to walk around. Elevation of the foot of your bed 4-6\" helps the blood return back to where it is needed.       Please call one of the locations below to schedule an appointment. If you received a prescription please bring it with you to your appointment. Some locations are limited to what they carry.    [facility]    [facility] [place]  [address]   [place] [address]   Phone: [phone]   Fax: [phone]    [facility]  [address]  [place] [address]  Phone: [phone]    [facility]   [address] ([facility] [place] [place] [address]  Phone: [phone]    [facility] [place]  [address]   [place] [address]   Phone: [phone]    [facility]  [address]  [place] [address]  Phone: [phone]    [facility]   [address]             [address]                 [address]     [place] [address]      [place] [address]         [place] [address]  Phone: [phone]      Phone: [phone]            Phone: [phone]  Fax: [phone]          Fax:[phone]                 Fax: [phone]                                     Alli Martinez  [phone]  [email or web address]                                                                Varicose Vein Pre-Procedure Instructions    You are scheduled for a varicose vein treatment on your legs. The following is some helpful information for you in regards to your treatment.    **Important:  A  will be needed post procedure. We will supply a thigh high compression stocking for you unless if you have one.  Please be aware, it is not advised to fly within 3 weeks post procedure    Please wear comfortable clothing.  We recommend that you bring a change of under clothes; they may get stained by the cleansing solution.    Feel free to bring a personal music player or a CD to listen to during your procedure.    Take your routine medications as you normally would.    It is ok to eat prior to this procedure.    Please allow 1- 2 hours for your appointment.    For any questions regarding your procedure please call   [phone] to speak with the nurse.    If you would like a Good Agueda Estimate for your upcoming service/procedure contact Cost of Care Estimates at [phone], advocates are available Monday through Friday 8am - 5pm.    Please have the following information available:  1. Patient name and date of birth  2. Insurance company, plan name, ID and group numbers  3. Description of the service/procedure and the associated procedure code numbers, if available. If more than one (1) procedure code, indicate which will be the primary procedure code.   4. The facility where the service/procedure will be performed.  5. The name of the physician involved with the service/procedure.  6. Appointment date of service.  7. Telephone number to call with the information.

## 2021-06-03 NOTE — PROGRESS NOTES
3 month f/u compression stockings. 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 Patient stated the socks do seem to help but still does have pain at bedtime, rated 6 out of 10 in bilateral legs.

## 2021-06-04 VITALS
TEMPERATURE: 98.3 F | RESPIRATION RATE: 16 BRPM | DIASTOLIC BLOOD PRESSURE: 80 MMHG | HEIGHT: 65 IN | OXYGEN SATURATION: 96 % | SYSTOLIC BLOOD PRESSURE: 122 MMHG | HEART RATE: 76 BPM | WEIGHT: 217 LBS | BODY MASS INDEX: 36.15 KG/M2

## 2021-06-04 VITALS — BODY MASS INDEX: 33.28 KG/M2 | WEIGHT: 200 LBS | TEMPERATURE: 96.4 F

## 2021-06-09 NOTE — PROGRESS NOTES
"Shahnaz Arias is a 60 y.o. female who is being evaluated via a billable telephone visit.      The patient has been notified of following:     \"This telephone visit will be conducted via a call between you and your physician/provider. We have found that certain health care needs can be provided without the need for a physical exam.  This service lets us provide the care you need with a short phone conversation.  If a prescription is necessary we can send it directly to your pharmacy.  If lab work is needed we can place an order for that and you can then stop by our lab to have the test done at a later time.    Telephone visits are billed at different rates depending on your insurance coverage. During this emergency period, for some insurers they may be billed the same as an in-person visit.  Please reach out to your insurance provider with any questions.    If during the course of the call the physician/provider feels a telephone visit is not appropriate, you will not be charged for this service.\"    Patient has given verbal consent to a Telephone visit? Yes    What phone number would you like to be contacted at? [phone]    Patient would like to receive their AVS by AVS Preference: Emmy.        Assessment/Plan:     1. Cellulitis of finger of right hand  doxycycline (VIBRA-TABS) 100 MG tablet       Diagnoses and all orders for this visit:    Cellulitis of finger of right hand  -     doxycycline (VIBRA-TABS) 100 MG tablet; Take 1 tablet (100 mg total) by mouth 2 (two) times a day for 10 days.  Dispense: 20 tablet; Refill: 0    We will treat with doxycycline 100 mg twice daily for 10 days.  Counseled her on use of medications as well as side effects.  Recommend she continue with icing.  For itching she can use topical hydrocortisone cream or try some Benadryl or Zyrtec.  Use ibuprofen or Tylenol if needed for discomfort.  Monitor for increasing redness, swelling or drainage.  Should contact clinic and schedule " a face-to-face visit if these occur or if new concerning symptoms develop such as fever, chills or body aches.  She is comfortable with this plan.       Subjective:      Shahnaz Arias is a 60 y.o. female who is evaluated today via telephone encounter for concern of possible infection on her right thumb.  Initially attempted to do a video visit but unable to complete because patient's phone was not compatible with the program.  She reports that she was gardening and wearing gardening gloves earlier this week.  On Tuesday or Wednesday she believes that she got bit by a bug through the gloves.  Noticed a bump on her right thumb yesterday.  Since then she has had development of redness, stiffness and swelling throughout the thumb.  It is difficult to bend her thumb because of the stiffness.  States it is not very painful.  It is a little bit itchy.  She has noticed a couple of blisterlike bumps on the hand.  She otherwise feels fine.  She has not had fever or chills.  She has not had body aches.  She has been applying ice which has been helpful.  She does have a history of developing cellulitis with bee stings and insect bites in the past.  In 2017 she had cellulitis of her thigh related to an insect bite and she states that this feels exactly the same.  She has no other concerns or questions.  Medications and allergies are reviewed and updated.  Review of systems is otherwise negative    Current Outpatient Medications   Medication Sig Dispense Refill     calcium carbonate-vitamin D3 (CALCIUM 600 WITH VITAMIN D3) 600 mg(1,500mg) -200 unit per tablet Take 1 tablet by mouth daily.       cholecalciferol, vitamin D3, (VITAMIN D3) 2,000 unit cap Take by mouth daily.       coenzyme Q10 (CO Q-10) 100 mg capsule Take 100 mg by mouth daily. Take as directed.       cranberry 400 mg cap Take 1 tablet by mouth daily.       omega-3 fatty acids-vitamin E (FISH OIL) 1,000 mg cap Take by mouth daily.       doxycycline (VIBRA-TABS) 100  MG tablet Take 1 tablet (100 mg total) by mouth 2 (two) times a day for 10 days. 20 tablet 0     naproxen (NAPROSYN) 500 MG tablet Take 1 tablet (500 mg total) by mouth 2 (two) times a day as needed. 60 tablet 4     Current Facility-Administered Medications   Medication Dose Route Frequency Provider Last Rate Last Dose     lidocaine 1%-EPINEPHrine 1:100,000 112 mL in sodium chloride 0.9% 1,000 mL (TUMESCENT)  1,000 mL Irrigation Q1H PRN Hai Prater MD           Past Medical History, Family History, and Social History reviewed.  No past medical history on file.  Past Surgical History:   Procedure Laterality Date     HYSTERECTOMY  2002     IA TOTAL ABDOM HYSTERECTOMY      Description: Total Abdominal Hysterectomy;  Recorded: 03/28/2013;     VEIN LIGATION AND STRIPPING Bilateral      Sulfa (sulfonamide antibiotics)  Family History   Problem Relation Age of Onset     Breast cancer Maternal Aunt      Dementia Maternal Aunt         Alzheimers     Breast cancer Maternal Aunt      Dementia Mother         Alzheimer     Varicose Veins Mother      Dementia Maternal Aunt         Alzheimers     Varicose Veins Sister      Varicose Veins Brother      Social History     Socioeconomic History     Marital status:      Spouse name: Not on file     Number of children: Not on file     Years of education: Not on file     Highest education level: Not on file   Occupational History     Not on file   Social Needs     Financial resource strain: Not on file     Food insecurity     Worry: Not on file     Inability: Not on file     Transportation needs     Medical: Not on file     Non-medical: Not on file   Tobacco Use     Smoking status: Never Smoker     Smokeless tobacco: Never Used   Substance and Sexual Activity     Alcohol use: No     Drug use: Never     Sexual activity: Yes     Partners: Male     Birth control/protection: None   Lifestyle     Physical activity     Days per week: Not on file     Minutes per session: Not on  file     Stress: Not on file   Relationships     Social connections     Talks on phone: Not on file     Gets together: Not on file     Attends Restorationism service: Not on file     Active member of club or organization: Not on file     Attends meetings of clubs or organizations: Not on file     Relationship status: Not on file     Intimate partner violence     Fear of current or ex partner: Not on file     Emotionally abused: Not on file     Physically abused: Not on file     Forced sexual activity: Not on file   Other Topics Concern     Not on file   Social History Narrative     Not on file         Review of systems is as stated in HPI, and the remainder of the 10 system review is otherwise negative.    Objective:     Vitals:    06/18/20 1311   Temp: (!) 96.4  F (35.8  C)   TempSrc: Oral   Weight: 200 lb (90.7 kg)    Body mass index is 33.28 kg/m .    Exam: She is alert.  She is speaking clearly.  She does not sound short of breath        This note has been dictated using voice recognition software. Any grammatical or context distortions are unintentional and inherent to the the software.       Phone call duration:  9 minutes    Cyn Garrido MD

## 2021-06-09 NOTE — TELEPHONE ENCOUNTER
RN Triage  Shahnaz is calling today. [place] something on her thumb. It is all red and warm, starting to spread redness up her thumb. She has had infections 2 other times from bee stings for which she has needed antibiotics. Painful to bend her thumb. No fever she is aware of.     I advised Shahnaz that she should have a video visit today with a provider to evaluate her thumb. She agrees to this plan and understands to call back with worsening. Scheduling assisted to set up a video visit today.    Esme Pelaez RN  [facility] Nurse Advisor      Reason for Disposition    Red or very tender (to touch) area, and started over 24 hours after the bite    Additional Information    Negative: Passed out (i.e., fainted, collapsed and was not responding)    Negative: Wheezing or difficulty breathing    Negative: Hoarseness, cough or tightness in the throat or chest    Negative: Swollen tongue or difficulty swallowing    Negative: Life-threatening reaction (anaphylaxis) in the past to same insect bite and < 2 hours since bite    Negative: Sounds like a life-threatening emergency to the triager    Negative: Patient sounds very sick or weak to the triager    Negative: SEVERE bite pain and not improved after 2 hours of pain medicine    Negative: Fever and area is red    Negative: Fever and area is very tender to touch    Negative: Red streak or red line and length > 2 inches (5 cm)    Protocols used: INSECT BITE-A-OH    COVID 19 Nurse Triage Plan/Patient Instructions    Please be aware that novel coronavirus (COVID-19) may be circulating in the community. If you develop symptoms such as fever, cough, or SOB or if you have concerns about the presence of another infection including coronavirus (COVID-19), please contact your health care provider or visit [email or web address].     Disposition/Instructions    Patient to schedule a Virtual Visit with provider. Reference Visit Selection  Guide.    Thank you for taking steps to prevent the spread of this virus.  o Limit your contact with others.  o Wear a simple mask to cover your cough.  o Wash your hands well and often.    Resources    [facility]: About COVID-19: [email or web address]    CDC: What to Do If You're Sick: www.cdc.gov/coronavirus/2019-ncov/about/steps-when-sick.html    CDC: Ending Home Isolation: www.cdc.gov/coronavirus/2019-ncov/hcp/disposition-in-home-patients.html     CDC: Caring for Someone: www.cdc.gov/coronavirus/2019-ncov/if-you-are-sick/care-for-someone.html     [facility]: Interim Guidance for Hospital Discharge to Home: www.[facility]/diseases/coronavirus/hcp/hospdischarge.pdf    [facility] clinical trials (COVID-19 research studies): clinicalaffairs.[facility]-clinical-trials     Below are the COVID-19 hotlines at the [place] [facility] ([facility]). Interpreters are available.   o For health questions: Call 123-104-9251 or 1-299.644.3283 (7 a.m. to 7 p.m.)  o For questions about schools and childcare: Call 444-691-5220 or 1-586.397.8162 (7 a.m. to 7 p.m.)

## 2021-06-09 NOTE — PROGRESS NOTES
Prior Authorization for radiofrequency ablation resubmitted to [facility]. Reference # [id number].

## 2021-06-11 NOTE — PROGRESS NOTES
Annual Physical    1. Well adult exam  Glucose    Hemoglobin    Lipid Cascade    Hepatic Profile   2. Lumbar strain, sequela     3. Rotator cuff syndrome of shoulder and allied disorders, right     4. Vitamin D deficiency  Vitamin D, Total (25-Hydroxy)   5. Varicose veins of bilateral lower extremities with pain     6. Plantar fasciitis, bilateral     7. Obesity (BMI 30-39.9)       Discussion of plan.  Weight loss efforts are excellent and she is on maintenance at weight watchers.  Her goal weight is 175 and she is doing quite well staying within her range on a regular basis.  Her lumbar strain issues resolved as she lost weight.  Her rotator cuff syndrome with the right shoulder improved with surgery.  After a year of rehab she is about 90%.  Vitamin D deficiency is been present and will monitor  She has had varicose vein surgery in the past but still has what look like redundant veins.  Should she be interested I can refer her to Hai Prater for evaluation of venous closure.  She wants to talk to her insurance company to see if that would be covered.  Patient's plantar fasciitis have resolved bilaterally her exam is excellent.  Patient's obesity had been in the BMI 40 range and currently is 29 so she is just dipped out of the obesity category which is excellent.    Subjective:      Jacky is a 57 y.o. female presenting to my clinic for annual exam.  Best done very well with weight watchers as a diet managing program.  She has lost and regained her weight in the past and currently her goal is 175 with a BMI of 29.    Patient has rehabbed a right shoulder surgery very well.  I test her shoulder today and see that she has 90-95% recovery.  Good strength in the shoulder.  Getting to bike with the shoulder.  Dr. Mg from [facility] or get her start surgery and is very pleased as well.    Patient had bilateral plantar fasciitis and had surgery on her right calcaneal bone but her left just went away on  I have reviewed patients results. Pap smear is normal.  Please contact patient to convey results.     Elzbieta Garcia MD its own with weight loss.  Her right heel is excellent and she can now walk well.    Patient has varicose veins and previously has had vein stripping.  Currently she has some redundant veins and wondering whether further VNUS Closure may be indicated.  She is going to think about it and see if she has insurance coverage but I do I give her the information about the vascular center and Dr. Hai Prater our surgeon who does VNUS Closure.    Patient is doing very well feels feels happy and is challenging herself with exercise.  She is just beginning to get her bike out..      Current Outpatient Prescriptions on File Prior to Visit   Medication Sig Dispense Refill     calcium carbonate-vitamin D3 (CALCIUM 600 WITH VITAMIN D3) 600 mg(1,500mg) -200 unit per tablet Take 1 tablet by mouth daily.       cholecalciferol, vitamin D3, (VITAMIN D3) 2,000 unit cap Take by mouth daily.       coenzyme Q10 (CO Q-10) 100 mg capsule Take 100 mg by mouth daily. Take as directed.       cranberry 400 mg cap Take 1 tablet by mouth daily.       omega-3 fatty acids-vitamin E (FISH OIL) 1,000 mg cap Take by mouth daily.       B INFANTIS/B ANI/B EN/B BIFID (PROBIOTIC 4X ORAL) Take by mouth.       naproxen (NAPROSYN) 500 MG tablet Take 1 tablet (500 mg total) by mouth 2 (two) times a day as needed. 60 tablet 4     No current facility-administered medications on file prior to visit.      Allergies   Allergen Reactions     Sulfa (Sulfonamide Antibiotics)      History reviewed. No pertinent past medical history.  Past Surgical History:   Procedure Laterality Date     HYSTERECTOMY  2002     IA TOTAL ABDOM HYSTERECTOMY      Description: Total Abdominal Hysterectomy;  Recorded: 03/28/2013;     Social History     Social History     Marital status:      Spouse name: N/A     Number of children: N/A     Years of education: N/A     Occupational History     Not on file.     Social History Main Topics     Smoking status: Never Smoker     Smokeless  "tobacco: Never Used     Alcohol use Yes     Drug use: Not on file     Sexual activity: Yes     Partners: Male     Birth control/ protection: None     Other Topics Concern     Not on file     Social History Narrative     Family History   Problem Relation Age of Onset     Breast cancer Maternal Aunt        Review of systems: The rest of the review of systems are negative for all systems.  12 point system reviewed, negative except for HPI items     Current allergies, medications, and problem list are all reviewed and updated in the chart.    Healthy Habits:   Regular Exercise: Yes  Healthy Diet: Yes  Dental Visits Regularly: Yes  Seat Belt: Yes   Colonoscopy: Yes  Lipid Profile: Yes  Glucose-yes    Physical exam:  /80 (Patient Site: Right Arm, Patient Position: Sitting, Cuff Size: Adult Regular)  Pulse 72  Ht 5' 5\" (1.651 m)  Wt 178 lb (80.7 kg)  BMI 29.62 kg/m2    Body mass index is 29.62 kg/(m^2).  Weight watchers goal weight is 175.  /Gives her a BMI of 29 which is acceptable -yes discussed    Health Maintenance reviewed:  Lipid Profile: Yes  Glucose Screen: Yes  Colonoscopy: Yes  Mammogram: Yes    Immunization History   Administered Date(s) Administered     DT (pediatric) 11/10/1993, 01/27/2003     Influenza, inj, historic 10/06/2010     Influenza, seasonal,quad inj 6-35 mos 08/30/2012, 10/14/2013, 10/01/2014     Td, historic 08/10/2009     Tdap 08/10/2009     Immunization status: up to date and documented.    Gynecologic History  No LMP recorded. Patient has had a hysterectomy.  Contraception: Postmenopausal  Last Pap: Pre-hysterectomy. Results were: Normal      General Appearance:  Alert, cooperative, no distress, appears stated age   Head:  Normocephalic, without obvious abnormality, atraumatic   Eyes:  PERRL, conjunctiva/corneas clear, EOM's intact   Ears:  Normal TM's and external ear canals, both ears   Nose: Nares normal, septum midline,mucosa normal, no drainage    Throat: Lips, mucosa, and tongue " normal; teeth and gums normal   Neck: Supple, symmetrical, trachea midline, no adenopathy;  thyroid: not enlarged, symmetric, no tenderness/mass/nodules; no carotid bruit or JVD   Back:   Symmetric, no curvature, ROM normal, no CVA tenderness   Lungs:   Clear to auscultation bilaterally, respirations unlabored   Breasts:  No breast masses, tenderness, asymmetry, or nipple discharge.   Heart:  Regular rate and rhythm, S1 and S2 normal, no murmur, rub, or gallop   Abdomen:   Soft, non-tender, bowel sounds active, no masses, no organomegaly   Genitalia: deferred I now is    Rectal:  No hemorrhoids   Extremities: Extremities normal, atraumatic, no cyanosis or edema  Varicose veins upper L thigh   Skin: Skin color, texture, turgor normal, no rashes or lesions   Lymph nodes: Cervical, supraclavicular, and axillary nodes normal   Neurologic: Normal, CN 2-12 intact                 The following high BMI interventions were performed this visit: encouragement to exercise and weight monitoring        Maricruz Dougherty MD  [facility]  [phone]    [facility]  This note has been dictated using voice recognition software. Any grammatical or context distortions are unintentional and inherent to the software.

## 2021-06-11 NOTE — TELEPHONE ENCOUNTER
Upcoming Appointment Question  When is the appointment: 9/16  What is your appointment for?: blood pressure, patient reported her BP was 160/96 yesterday right before her surgery & was 140/80 today  Who is your appointment scheduled with?: PCP only  What is your question/concern?: patient would like to know if she should be seen sooner  Okay to leave a detailed message?: Yes

## 2021-06-11 NOTE — PROGRESS NOTES
"Shahnaz Arias is a 60 y.o. female who is being evaluated via a billable video visit.      The patient has been notified of following:     \"This video visit will be conducted via a call between you and your physician/provider. We have found that certain health care needs can be provided without the need for an in-person physical exam.  This service lets us provide the care you need with a video conversation.  If a prescription is necessary we can send it directly to your pharmacy.  If lab work is needed we can place an order for that and you can then stop by our lab to have the test done at a later time.    Video visits are billed at different rates depending on your insurance coverage. Please reach out to your insurance provider with any questions.    If during the course of the call the physician/provider feels a video visit is not appropriate, you will not be charged for this service.\"    Patient has given verbal consent to a Video visit? Yes  How would you like to obtain your AVS? AVS Preference: MyChart.  If dropped by the video visit, the video invitation should be sent to: Text to cell phone: [phone]  Will anyone else be joining your video visit? No      Video-Visit Details    Type of service:  Video Visit    Originating Location (pt. Location): Home    Distant Location (provider location):  [facility]      Platform used for Video Visit: Pravin Goodrich LPN    "

## 2021-06-11 NOTE — PROGRESS NOTES
"Shahnaz Arias is a 60 y.o. female who is being evaluated via a billable video visit.      The patient has been notified of following:     \"This video visit will be conducted via a call between you and your physician/provider. We have found that certain health care needs can be provided without the need for an in-person physical exam.  This service lets us provide the care you need with a video conversation.  If a prescription is necessary we can send it directly to your pharmacy.  If lab work is needed we can place an order for that and you can then stop by our lab to have the test done at a later time.    Video visits are billed at different rates depending on your insurance coverage. Please reach out to your insurance provider with any questions.    If during the course of the call the physician/provider feels a video visit is not appropriate, you will not be charged for this service.\"    Patient has given verbal consent to a Video visit? Yes  How would you like to obtain your AVS? AVS Preference: MyChart.  If dropped by the video visit, the video invitation should be sent to: Text to cell phone: [phone]  Will anyone else be joining your video visit? No        Video Start Time: 7:40        Assessment/Plan:     1. Benign essential hypertension  lisinopriL (PRINIVIL,ZESTRIL) 10 MG tablet    Comprehensive Metabolic Panel    Lipid [place] FASTING    Thyroid Stimulating Hormone (TSH)   2. Overweight  Comprehensive Metabolic Panel    Lipid [place] FASTING    Hemoglobin    Thyroid Stimulating Hormone (TSH)       Diagnoses and all orders for this visit:    Benign essential hypertension  -     lisinopriL (PRINIVIL,ZESTRIL) 10 MG tablet; Take 1 tablet (10 mg total) by mouth daily.  Dispense: 90 tablet; Refill: 3  -     Comprehensive Metabolic Panel; Future; Expected date: 09/30/2020  -     Lipid [place] FASTING; Future; Expected date: 09/30/2020  -     Thyroid Stimulating Hormone (TSH); Future; Expected date: " 09/30/2020  -Since she has gained weight and has been more sedentary over the last few months, we discussed that this is likely causing her to have increased blood pressure readings.  Did recommend that we resume medication for treatment of hypertension at this time.  Discussed that if she is able to work on intensive lifestyle changes and lose weight, we can look at discontinuing this medication again in the future.  Discussed medication options and we elected to start a low-dose of lisinopril 10 mg daily for treatment of hypertension.  Prescription was sent to pharmacy.  She was counseled on use of this medication and side effects.  We will have her come in for labs and orders are placed for TSH, lipids and comprehensive metabolic panel.  Advised her to do these labs in about 2 weeks.  We will have her monitor blood pressure readings at home and send readings to me through my chart in 2 weeks to review.  Contact me if any significant concerns or side effects with starting the medication    Overweight  -     Comprehensive Metabolic Panel; Future; Expected date: 09/30/2020  -     Lipid [place] FASTING; Future; Expected date: 09/30/2020  -     Hemoglobin; Future; Expected date: 09/30/2020  -     Thyroid Stimulating Hormone (TSH); Future; Expected date: 09/30/2020  -Encouraged weight loss efforts.  She has been advised not to be as physically active due to recent surgery but is hoping that she will get the okay from her surgeon to become more physically active in the near future.             The following high BMI interventions were performed this visit: encouragement to exercise    Subjective:      Shahnaz Arias is a 60 y.o. female who is evaluated today for concern of recent elevated blood pressure readings.  She has a history of hypertension and previously took triamterene-hydrochlorothiazide.  She discontinued this about 3 years ago when she lost a significant amount of weight and was experiencing lightheadedness  and had a syncopal episode for which she was seen in the emergency department.  She reports that recently she has had some elevated blood pressure readings.  She was at the dentist and it was checked and the blood pressure was high.  She also recently had surgery and her blood pressure was 160/80.  Since then she has been monitoring her blood pressure at home.  She reports that systolic readings have ranged between 130 and 160 and diastolic readings have ranged between 77 and 95.  Her blood pressure this morning was 151/84.  She reports that she has gained weight over the last few months and has been more sedentary.  She has been snacking more throughout the day but does try to choose healthy snacks.  She reports that her recent weight on Monday was 228 pounds.  Her weight last November was 219 pounds.  She reports that she is otherwise feeling fine.  She has not recently had any significant changes in diet.  No new medications.  She does have some occasional episodes of lightheadedness with position changes.  Otherwise she has not had any significant increase in headaches, vision changes.  She denies chest pain and pressure.  No shortness of breath.  She does have some lower extremity edema but recently underwent vein surgery.  Review of systems is assessed and is otherwise negative.  She has no additional concerns or questions today.    Current Outpatient Medications   Medication Sig Dispense Refill     ascorbic acid, vitamin C, (ASCORBIC ACID WITH KEYA HIPS) 500 MG tablet Take 500 mg by mouth daily.       calcium carbonate-vitamin D3 (CALCIUM 600 WITH VITAMIN D3) 600 mg(1,500mg) -200 unit per tablet Take 1 tablet by mouth daily.       cholecalciferol, vitamin D3, (VITAMIN D3) 2,000 unit cap Take by mouth daily.       coenzyme Q10 (CO Q-10) 100 mg capsule Take 100 mg by mouth daily. Take as directed.       cranberry 400 mg cap Take 1 tablet by mouth daily.       omega-3 fatty acids-vitamin E (FISH OIL) 1,000 mg  cap Take by mouth daily.       lisinopriL (PRINIVIL,ZESTRIL) 10 MG tablet Take 1 tablet (10 mg total) by mouth daily. 90 tablet 3     naproxen (NAPROSYN) 500 MG tablet Take 1 tablet (500 mg total) by mouth 2 (two) times a day as needed. 60 tablet 4     No current facility-administered medications for this visit.        Past Medical History, Family History, and Social History reviewed.  Past Medical History:   Diagnosis Date     Arthropathy Of Multiple Sites     Created by Conversion  Replacement Utility updated for latest IMO load     Past Surgical History:   Procedure Laterality Date     HYSTERECTOMY  2002     [place] TOTAL ABDOM HYSTERECTOMY      Description: Total Abdominal Hysterectomy;  Recorded: 03/28/2013;     VEIN LIGATION AND STRIPPING Bilateral      Sulfa (sulfonamide antibiotics)  Family History   Problem Relation Age of Onset     Breast cancer Maternal Aunt      Dementia Maternal Aunt         Alzheimers     Breast cancer Maternal Aunt      Dementia Mother         Alzheimer     Varicose Veins Mother      Dementia Maternal Aunt         Alzheimers     Varicose Veins Sister      Varicose Veins Brother      Social History     Socioeconomic History     Marital status:      Spouse name: Not on file     Number of children: Not on file     Years of education: Not on file     Highest education level: Not on file   Occupational History     Not on file   Social Needs     Financial resource strain: Not on file     Food insecurity     Worry: Not on file     Inability: Not on file     Transportation needs     Medical: Not on file     Non-medical: Not on file   Tobacco Use     Smoking status: Never Smoker     Smokeless tobacco: Never Used   Substance and Sexual Activity     Alcohol use: No     Drug use: Never     Sexual activity: Yes     Partners: Male     Birth control/protection: None   Lifestyle     Physical activity     Days per week: Not on file     Minutes per session: Not on file     Stress: Not on file    Relationships     Social connections     Talks on phone: Not on file     Gets together: Not on file     Attends Anabaptism service: Not on file     Active member of club or organization: Not on file     Attends meetings of clubs or organizations: Not on file     Relationship status: Not on file     Intimate partner violence     Fear of current or ex partner: Not on file     Emotionally abused: Not on file     Physically abused: Not on file     Forced sexual activity: Not on file   Other Topics Concern     Not on file   Social History Narrative     Not on file         Review of systems is as stated in HPI, and the remainder of the 10 system review is otherwise negative.    Objective:     There were no vitals filed for this visit. There is no height or weight on file to calculate BMI.      Additional provider notes: GENERAL: Healthy, alert and no distress  EYES: Eyes grossly normal to inspection. No discharge or erythema, or obvious scleral/conjunctival abnormalities.  RESP: No audible wheeze, cough, or visible cyanosis.  No visible retractions or increased work of breathing.    NEURO: Cranial nerves grossly intact. Mentation and speech appropriate for age.  PSYCH: Mentation appears normal, affect normal/bright, judgement and insight intact, normal speech and appearance well-groomed      Video-Visit Details    Type of service:  Video Visit    Video End Time (time video stopped): 7:57  Originating Location (pt. Location): Home    Distant Location (provider location):  [facility] MEDICINE/OB     Platform used for Video Visit: Pravin Garrido MD

## 2021-06-11 NOTE — TELEPHONE ENCOUNTER
I agree with recommendation. Will review readings at upcoming visit.  I also recommend healthy diet, regular exercise, limiting alcohol and caffeine, limiting salt in diet

## 2021-06-11 NOTE — PROGRESS NOTES
VNUS Radiofrequency Ablation    Pre-Procedure:  Admit  [x]Consent for Radio Frequency Ablation of the   [x]Right Saphenous Vein and/or branches  [x]Left Saphenous Vein and/or branches  Vitals  [x]Vital signs per routine    Nursing Orders  [x]Vein Mapping of  [x]R extremity  [x]L extremity  [x]Sterile Prep to extremity  [x]Obtain Tumescent Solution 500mL (NS 1000mL, 1% Lidocaine w Epi 56mL, 8.4% Sodium Bicarbonate 5.6mL)    Medications  []Diazepam (Valium) 5mg PO, May Repeat x 1 for anxiety, relaxtion.    Post-Procedure:  Admission  [x]Post Procedure care - call physician for status update  []Admit to inpatient - Please document reason for admission in chart    Interventions require inpatient services    High risk of deterioration due to comorbidities    High risk of deterioration due to nature of admitting diagnosis  Location:    Vitals  [x]Vital signs per routine    Nursing Orders  [x]Thigh High Compression Stocking 20-30mm or 30-40mm to  [x]R extremity  [x]L extremity  [x]Check insertion site for bleeding or hematoma.  If bleeding or hematoma occurs, apply pressure and notify MD    Discharge  [x]Discharge home if no complications arise.  [x]Give post radiofrequency ablation discharge instructions to patient.  [x]Follow up venous ultrasound 72-96 hours after procedure.  [x]Follow up appointment with MD at 2 weeks.  [x]Contact MD for further questions

## 2021-06-11 NOTE — PATIENT INSTRUCTIONS - HE
Home Care Instructions Following Radiofrequency Closure of the Greater Saphenous Vein (VNUS)    Dressing    Wear your compression for 48 hours continuously until your ultrasound after the procedure.  You can remove your stocking to shower in 24 hours but then reapply after.    Wear the stocking for two weeks after the procedure while you are up.    Activity    The day of the procedure make sure to walk around the house for a few minutes each hour until bedtime.    The day after the procedure you should advance activity as tolerated.  NO strenuous activities though for 2 weeks.  In general avoid prolonged standing in place and sitting with your legs down.  Both of these activities will cause blood to pool in your legs resulting in more swelling and discomfort.    Do not drive or operate heavy machinery for 24 hours after the procedure.    Do not take baths or use hot tubs or swimming pools until your incision site is healed.    Do not fly for the Next 3 weeks.    Post Procedure Ultrasound    You will need an ultrasound within 2-3 days following the closure procedure.  Please schedule an ultrasound if you have not already done so.    Post Procedure Signs and Symptoms    There can be a mild amount of bruising and numbness after this procedure.  This will typically take a few weeks to fade.    You may feel pain or tenderness in your inner thigh.  Mild pain medication such as Tylenol or Ibuprofen maybe helpful.    It is normal to have fluid leaking from the injection areas the day of the procedure and it may be blood tinged.    Call if you have    Fever greater than 100.8 degrees F.    Uncontrolled bleeding from the incision site.    Pain that is not relieved by rest or pain medication.    Shortness of breath    Follow -up    Please plan to see your surgeon in the office two weeks after your procedure    Call [phone] to schedule this appointment if one has not already been made    You will receive a phone call  from our staff within 48 hours of your procedure.  Please have these instruction near by so that any questions can be addressed at that time.  If you have any concerns before that time, please do not hesitate to call us al [phone] and ask to speak to a nurse.  We want you to have a smooth recovery.    For emergencies after 4:30pm Monday - Friday, holidays and weekends:  For Dr Hai Prater call [facility] at [phone]  [facility] [phone]

## 2021-06-12 NOTE — PROGRESS NOTES
Assessment/Plan:     1. Bee sting     2. Cellulitis of right thigh         Diagnoses and all orders for this visit:    Bee sting    Cellulitis of right thigh    Patient is allergic to sulfa.  Pharmacy noted concern about possible adverse reaction to cephalexin in the past.  Elected to treat with doxycycline 100 mg twice daily for 10 days.  Counseled on use of medication and side effects.  Continue with topical ice.  May use ibuprofen or Tylenol as needed for discomfort.  Monitor area of erythema.  Follow-up if significant worsening of redness, warmth or new concerning symptoms develop such as fevers, chills or respiratory distress.  Discussed it may take 1-2 days for antibiotic to take effect and to notice significant improvement in symptoms.  Also advised keeping right leg elevated as much as possible.           Subjective:      Shahnaz Arias is a 57 y.o. female who comes in today with concern about a bee sting.  She reports that she was working in her garage on Friday which is 3 days ago.  Noticed that her dogs were acting a little bit funny and when she investigated further, she saw that there were several bees around them.  She later noticed little area of redness on her right thigh associated with some discomfort.  Presumed that she got stung by a bee as well although it has not been painful.  Over the past couple of days she has noticed increasing warmth and redness as well as some hardness of the skin around the site of the sting.  It is getting darker and darker.  She has history of significant reactions to bee stings and has had to have IV antibiotics for treatment of them in the past.  She was seen at the walk-in clinic about a year ago for a bee sting near her hand and was treated with Augmentin.  She has not had fevers or chills.  She has otherwise been feeling well.  No body aches.  No dizziness or lightheadedness.  No respiratory distress.  She has been applying ice.  Review of systems is  otherwise negative.  She has no other concerns or questions.  Reviewed allergies and medications and updated the chart.    Current Outpatient Prescriptions   Medication Sig Dispense Refill     calcium carbonate-vitamin D3 (CALCIUM 600 WITH VITAMIN D3) 600 mg(1,500mg) -200 unit per tablet Take 1 tablet by mouth daily.       cholecalciferol, vitamin D3, (VITAMIN D3) 2,000 unit cap Take by mouth daily.       coenzyme Q10 (CO Q-10) 100 mg capsule Take 100 mg by mouth daily. Take as directed.       cranberry 400 mg cap Take 1 tablet by mouth daily.       naproxen (NAPROSYN) 500 MG tablet Take 1 tablet (500 mg total) by mouth 2 (two) times a day as needed. 60 tablet 4     omega-3 fatty acids-vitamin E (FISH OIL) 1,000 mg cap Take by mouth daily.       B INFANTIS/B ANI/B EN/B BIFID (PROBIOTIC 4X ORAL) Take by mouth.       doxycycline (VIBRA-TABS) 100 MG tablet Take 1 tablet (100 mg total) by mouth 2 (two) times a day for 10 days. 20 tablet 0     No current facility-administered medications for this visit.        Past Medical History, Family History, and Social History reviewed.  No past medical history on file.  Past Surgical History:   Procedure Laterality Date     HYSTERECTOMY  2002     [place] TOTAL ABDOM HYSTERECTOMY      Description: Total Abdominal Hysterectomy;  Recorded: 03/28/2013;     Sulfa (sulfonamide antibiotics)  Family History   Problem Relation Age of Onset     Breast cancer Maternal Aunt      Social History     Social History     Marital status:      Spouse name: N/A     Number of children: N/A     Years of education: N/A     Occupational History     Not on file.     Social History Main Topics     Smoking status: Never Smoker     Smokeless tobacco: Never Used     Alcohol use Yes     Drug use: Not on file     Sexual activity: Yes     Partners: Male     Birth control/ protection: None     Other Topics Concern     Not on file     Social History Narrative         Review of systems is as stated in HPI, and  the remainder of the 10 system review is otherwise negative.    Objective:     Vitals:    07/24/17 1258   BP: 132/74   Patient Site: Left Arm   Patient Position: Sitting   Cuff Size: Adult Large   Pulse: 77   Temp: 98.5  F (36.9  C)   SpO2: 98%   Weight: 180 lb 8 oz (81.9 kg)    Body mass index is 30.04 kg/(m^2).    General appearance: alert, appears stated age and cooperative  Lungs: clear to auscultation bilaterally  Heart: regular rate and rhythm, S1, S2 normal, no murmur, click, rub or gallop  Extremities: extremities normal, atraumatic, no cyanosis or edema  Skin: On anterior right thigh is approximately palm-sized area of erythema, warmth and induration with concerning appearance for cellulitis  Neurologic: Grossly normal        This note has been dictated using voice recognition software. Any grammatical or context distortions are unintentional and inherent to the the software.

## 2021-06-12 NOTE — PROGRESS NOTES
Post Procedure Note Endovenous Closure    Video Visit  Follow up  Start 9:47  End 9:57    S: Shahnaz Arias is a 60 y.o. female S/P Bilateral  leg endovenous closure ofBilateral lower ext. Two weeks out from procedure. Doing well, wore female  thigh high socks. Minimal discomfort. Some superficial phlibitis. Which is resolving.     O: There were no vitals filed for this visit.    General: no apparent distress  Legs look good no signs of infection, incisions healing nicely.    Imaging:    US Venous Post Ablation Legs Bilateral (Order [id number])  Imaging  Date: 9/4/2020 Department: [facility] [place] Released By: Charlee Ferrari CMA Authorizing: Hai Prater MD   Study Result    Bilateral Venous Ultrasound Status Post Radiofrequency Ablation        Indication: Follow-up saphenous vein Radiofrequency ablation     Date of Procedure: Right 09/02/20   Left 09/02/20      Right CFV Compression:  Fully Compressible     Left CFV Compression:  Fully Compressible     Reference:   (FC)-Fully Compressible       (PC)-Partially Compressible   (NC) Non-Compressible     Location Right AASV Left AASV   Proximal Thigh NC NC   Mid Thigh NC NC   Distal Thigh FC FC      Impression: Noncompressible bilateral anterior accessory saphenous veins from mid thigh to the proximal thigh.  No evidence of DVT         A/P: S/p endovenous closure. For insuffiencey of veins     May switch to knee high support socks   Resume all activities   RTC 4 months   Call for any questions or concerns     Hai Prater MD   [facility]

## 2021-06-14 NOTE — PROGRESS NOTES
KEYANNA FRIEDMAN is being evaluated via a billable video visit.      How would you like to obtain your AVS? Mail a copy.  If dropped from the video visit, the video invitation should be resent by: Text to cell phone: [phone]  Will anyone else be joining your video visit? No        Video-Visit Details    Type of service:  Video Visit    Originating Location (pt. Location): Home    Distant Location (provider location):  [facility]     Platform used for Video Visit: Pravin

## 2021-06-17 NOTE — PATIENT INSTRUCTIONS - HE
"Patient Instructions by Jahaira Goodrich LPN at 7/9/2019  9:00 AM     Author: Jahaira Goodrich LPN Service: -- Author Type: Licensed Nurse    Filed: 7/9/2019  3:47 PM Encounter Date: 7/9/2019 Status: Signed    : Jahaira Goodrich LPN (Licensed Nurse)       We are prescribing some compression stockings for you. I have included different suppliers that should help you get measured and fitting to ensure proper fitting socks. You should wear this socks as much as you can. It is especially important to wear them with long periods of sitting/standing, long car rides or if you will be flying. Compression socks should get refilled every 4-6 months. They do not need to be worn at night while in bed.    If you do a lot of standing it is good to do calf raises to help keep the blood pumping. If you sit a lot at work it is good to get up periodically to walk around. Elevation of the foot of your bed 4-6\" helps the blood return back to where it is needed.    We would like you to follow up in 3 months after wearing socks to see how you are doing.    Varicose Veins      Varicose veins are swollen, enlarged veins most often found in the legs. They are usually blue or purple in color and may bulge, twist, and stand out under the skin.  Normally, veins return blood from the body to the heart. The leg veins have one-way valves that prevent blood from flowing backward in the vein. When the valves are weak or damaged, blood backs up in the veins. This may cause some of the veins to swell and bulge and become varicose veins.  Symptoms  Varicose veins may or may not cause symptoms. If symptoms do occur, they can include:    Legs that feel tired, achy, heavy, or itchy    Leg muscle cramps    Skin changes, such as discoloration, dryness, redness, or rash (in more severe cases, you may also have sores on the skin called venous leg ulcers)  Risk Factors  There are a number of factors that increase the risk for varicose veins. " These can include:    Being a woman    Being older    Sitting or standing for long periods    Being overweight    Being pregnant    Having a family history of varicose veins  Treatment begins with simple self-help measures (see below). If these dont help, there are many procedures that can be done to shrink or remove varicose veins. Your healthcare provider can tell you more about these options, if needed.  Home care    Support or compression stockings will likely be prescribed. If so, be sure to wear them as directed. They may help improve blood flow.    Exercising helps strengthen your leg muscles and improve blood flow. To get the most benefit, choose exercises such as walking, swimming, or cycling. Also try to exercise for at least 30 minutes on most days.    Raising (elevating) your legs lets gravity help blood flow back to the heart. Sit or lie with your feet above heart level a few times throughout the day, or as directed.    Avoid long periods of sitting or standing. Change positions often. Also, move your ankles, toes and knees often. This may also help improve blood flow.    If you are overweight, talk with your healthcare provider about setting up a weight-loss plan. Maintaining a healthy weight can help reduce the strain on your veins. It may also improve symptoms, such as swelling and aching.    If you have dryness and itching, ask your provider about special lotions that can be applied to the skin to help improve symptoms.  Follow-up care  Follow up with your healthcare provider, or as directed. If imaging tests were done, youll be told the results and if there are any new findings that affect your care.  When to seek medical advice  Call your healthcare provider right away if any of these occur:    Sudden, severe leg swelling, pain, or redness    Symptoms worsen, or they dont improve with self-care    Bleeding from any affected veins    Ulcers form on the legs, ankles, or feet    Fever of 100.4 F  (38 C) or higher, or as advised by your provider      Understanding Spider and Varicose Veins  Do you often hide your legs because of the way they look? You may have noticed tiny red or blue bursts (spider veins). Or maybe you have veins that bulge or look twisted (varicose veins). If so, there are treatments that can help  What are the symptoms?  Spider veins or varicose veins may never be a problem. But sometimes they can cause legs to ache or swell. Your legs may also feel heavy and tired, or like theyre burning. These symptoms may be more severe at the end of the day. Prolonged sitting or standing can also make your symptoms worse.  Who gets spider and varicose veins?  Anyone can get spider or varicose veins. But vein problems tend to be hereditary (run in families). Other factors that can affect veins include:    Pregnancy, hormones, and birth control pills    A job where you stand or sit a lot    Extra weight or lack of exercise    Age         Spider veins look like tiny webs on the ankles, legs, and upper thighs.       Ropy, dark blue, red, or flesh-colored varicose veins are most common on the thighs, calves, and feet.    What can be done?  Spider and varicose veins can affect the way you feel about yourself. Talk to your healthcare provider about your concerns. There are treatments that can ease symptoms and make your legs look better.  Your treatment choices  Treatment may include self-care, sclerotherapy (injecting veins with a chemical), surgery, or newer nonsurgical minimally invasive therapies. Spider veins and some varicose veins can be treated with sclerotherapy. Large varicose veins can often be treated with newer minimally invasive procedures and, in rare cases, surgery may be needed.     Please call [facility] to schedule an appointment. If you received a prescription please bring it with you to your appointment. You may call one of the locations below, although some  locations are limited to what they carry.    Office Locations  New Locations  [facility]  Home Medical Equipment  [address], [place] [address]  [facility]otics [facility]sthetics ([facility])  [address], [place] [address]  Phone [phone] /Fax [phone]        [facility]   [address]   Medical Equipment [address]/Orthotics and Prosthetics [address]  [place] [address]   Phone: [phone]  Fax [phone]    [facility]  [address]  [place] [address]  Phone: [phone]  Fax: [phone]    [facility] Medical Bldg[facility]   [address]  [place] [address]  Phone: [phone]  Fax: [phone]    [facility] Professional Bldg[facility]  [address]  [place] [address]  Phone: [phone]  Fax: [phone]    [facility]  [address]  [place] [address]  Phone: [phone]  Fax: [phone]    [facility] [place]  [address]   [place] [address]   Phone: [phone]  Fax: [phone]    [place]   [address] [place] [address]   Phone: [phone]  Fax: [phone]    [facility]  [address]  [place] [address]    [place] [phone]  [phone]  [employer] [phone]  [email or web address]      [facility]   [address]             [address]                 [address]     [place] [address]      [place] [address]         [place], [place] [id number] [phone]  [phone]     [phone]               Fax: [phone]  [email or web address]                                                      [facility]  [address] [place] [address]  [phone]  [phone]  [email or web address]    Alli Martinez  [phone]  [email or web address]    [facility] supply   [phone]    [place] [address]  [place], [place] [id number] [phone]

## 2021-06-17 NOTE — TELEPHONE ENCOUNTER
Telephone Encounter by Nely Salinas Aide at 1/18/2021  1:39 PM     Author: Nely Salinas Aide Service: -- Author Type: Aide    Filed: 1/18/2021  1:43 PM Encounter Date: 1/18/2021 Status: Signed    : Nely Salinas Aide (Lashanda)

## 2021-06-17 NOTE — TELEPHONE ENCOUNTER
Telephone Encounter by Nely Salinas Aide at 9/16/2020 10:53 AM     Author: Nely Salinas Aide Service: -- Author Type: Aide    Filed: 9/16/2020 10:54 AM Encounter Date: 9/16/2020 Status: Signed    : Nely Salinas Aide (Lashanda)

## 2021-06-18 NOTE — PATIENT INSTRUCTIONS - HE
"Patient Instructions by Jahaira Goodrich LPN at 1/20/2021  8:20 AM     Author: Jahaira Goodrich LPN Service: -- Author Type: Licensed Nurse    Filed: 1/20/2021  8:14 AM Encounter Date: 1/20/2021 Status: Signed    : Jahaira Goodrich LPN (Licensed Nurse)       I have included different suppliers that should help you get measured and fitting to ensure proper fitting socks. You should wear these socks as much as you can. It is especially important to wear them with long periods of sitting/standing, long car rides or if you will be flying. Compression socks should get refilled every 4-6 months. They do not need to be worn at night while in bed.    If you do a lot of standing it is good to do calf raises to help keep the blood pumping. If you sit a lot at work it is good to get up periodically to walk around. Elevation of the foot of your bed 4-6\" helps the blood return back to where it is needed.    We would like you to follow up in 3 months after wearing socks to see how you are doing.    Varicose Veins  Varicose veins are swollen, enlarged veins most often found in the legs. They are usually blue or purple in color and may bulge, twist, and stand out under the skin.  Normally, veins return blood from the body to the heart. The leg veins have one-way valves that prevent blood from flowing backward in the vein. When the valves are weak or damaged, blood backs up in the veins. This may cause some of the veins to swell and bulge and become varicose veins.  Symptoms  Varicose veins may or may not cause symptoms. If symptoms do occur, they can include:    Legs that feel tired, achy, heavy, or itchy    Leg muscle cramps    Skin changes, such as discoloration, dryness, redness, or rash (in more severe cases, you may also have sores on the skin called venous leg ulcers)  Risk Factors  There are a number of factors that increase the risk for varicose veins. These can include:    Being a woman    Being " older    Sitting or standing for long periods    Being overweight    Being pregnant    Having a family history of varicose veins  Treatment begins with simple self-help measures (see below). If these dont help, there are many procedures that can be done to shrink or remove varicose veins. Your healthcare provider can tell you more about these options, if needed.  Home care    Support or compression stockings will likely be prescribed. If so, be sure to wear them as directed. They may help improve blood flow.    Exercising helps strengthen your leg muscles and improve blood flow. To get the most benefit, choose exercises such as walking, swimming, or cycling. Also try to exercise for at least 30 minutes on most days.    Raising (elevating) your legs lets gravity help blood flow back to the heart. Sit or lie with your feet above heart level a few times throughout the day, or as directed.    Avoid long periods of sitting or standing. Change positions often. Also, move your ankles, toes and knees often. This may also help improve blood flow.    If you are overweight, talk with your healthcare provider about setting up a weight-loss plan. Maintaining a healthy weight can help reduce the strain on your veins. It may also improve symptoms, such as swelling and aching.    If you have dryness and itching, ask your provider about special lotions that can be applied to the skin to help improve symptoms.  Follow-up care  Follow up with your healthcare provider, or as directed. If imaging tests were done, youll be told the results and if there are any new findings that affect your care.  When to seek medical advice  Call your healthcare provider right away if any of these occur:    Sudden, severe leg swelling, pain, or redness    Symptoms worsen, or they dont improve with self-care    Bleeding from any affected veins    Ulcers form on the legs, ankles, or feet    Fever of 100.4 F (38 C) or higher, or as advised by your  provider  Understanding Spider and Varicose Veins  Do you often hide your legs because of the way they look? You may have noticed tiny red or blue bursts (spider veins). Or maybe you have veins that bulge or look twisted (varicose veins). If so, there are treatments that can help  What are the symptoms?  Spider veins or varicose veins may never be a problem. But sometimes they can cause legs to ache or swell. Your legs may also feel heavy and tired, or like theyre burning. These symptoms may be more severe at the end of the day. Prolonged sitting or standing can also make your symptoms worse.  Who gets spider and varicose veins?  Anyone can get spider or varicose veins. But vein problems tend to be hereditary (run in families). Other factors that can affect veins include:    Pregnancy, hormones, and birth control pills    A job where you stand or sit a lot    Extra weight or lack of exercise    Age         Spider veins look like tiny webs on the ankles, legs, and upper thighs.       Ropy, dark blue, red, or flesh-colored varicose veins are most common on the thighs, calves, and feet.    What can be done?  Spider and varicose veins can affect the way you feel about yourself. Talk to your healthcare provider about your concerns. There are treatments that can ease symptoms and make your legs look better.  Your treatment choices  Treatment may include self-care, sclerotherapy (injecting veins with a chemical), surgery, or newer nonsurgical minimally invasive therapies. Spider veins and some varicose veins can be treated with sclerotherapy. Large varicose veins can often be treated with newer minimally invasive procedures and, in rare cases, surgery may be needed.     Please call [facility] to schedule an appointment. If you received a prescription please bring it with you to your appointment. You may call one of the locations below, although some locations are limited to what they  carry.    Office Locations  New Locations  [facility]  Home Medical Equipment  [address], [place] [address]  Or[facility]otics and Prosthetics ([facility])  [address], [place] [address]  Phone [phone] /Fax [phone]    [facility]   [address]   Medical Equipment [address]/Orthotics and Prosthetics [address]  [place] [address]   Phone: [phone]  Fax [phone]    [facility]  [address]  [place] [address]  Phone: [phone]  Fax: [phone]    [facility]   [address]  [place] [address]  Phone: [phone]  Fax: [phone]    [facility] Professional [facility]ld[facility]  [address]  [place] [address]  Phone: [phone]  Fax: [phone]    [facility]  [address]  [place] [address]  Phone: [phone]  Fax: [phone]    [facility] [place]  [address]   [place] [address]   Phone: [phone]  Fax: [phone]    [place]   [address] [place] [address]   Phone: [phone]  Fax: [phone]    [facility]   [address]             [address]                 [address]     [place] [address]      [place] [address]         [place] [address]  [phone]  [phone]     [phone]               Fax: [phone]  [email or web address]                                                [facility]  [address] [place] [address]  [phone]  [phone]  [email or web address]    Alli Martinez  [phone]  [email or web address]    [facility] Medical supply    [phone]    [facility]  [address] [place] [address]  [phone]

## 2021-06-19 NOTE — PROGRESS NOTES
Assessment:        1. Health care maintenance  Lipid Cascade FASTING   2. Fatigue  Comprehensive Metabolic Panel    HM2(CBC w/o Differential)   3. Overactive bladder     4. Obesity (BMI 30.0-34.9)     5. Ganglion cyst of joint of finger of left hand     6. Varicose veins of both lower extremities         Plan:      1. Healthcare maintenance: Up-to-date on mammogram and colonoscopy.  Does not need Pap smears as she had hysterectomy for benign reasons.  Check fasting lipids and glucose today.  Encouraged regular exercise, healthy diet and weight loss efforts  2. Fatigue: Check hemogram and conference of metabolic panel.  She is on vitamin D supplement.  Encouraged regular exercise and healthy diet.  3. Overactive bladder: Doing well on Detrol LA.  Refill provided  4. Obesity: Encouraged weight loss efforts  5. Ganglion cyst of finger of left hand: She will have this excised in a couple of weeks  6. Varicose veins of both lower extremities: She declines referral to vascular center at this time.  She will contact me if she changes her mind and would like referral.          Subjective:      Shahnaz Arias is a 58 y.o. female who presents for an annual exam.  Reviewed her health history.  Overall healthy individual.  History of varicose veins.  Had the procedure when she was in her 20s.  Has prominent varicose veins in her thighs.  Previous primary care provider has suggested evaluation at vascular center in past.  However patient is not very symptomatic.  Will have some restless legs at night but does not have a lot of discomfort in her legs or lower extremity edema.  Was seen in clinic a few weeks ago for a ganglion cyst on her finger.  She subsequently saw orthopedic specialist and will be having this cyst removed in a couple of weeks.  She was also started on Detrol LA for overactive bladder.  Since starting medication she has not had any adverse side effects.  Bowel movements have been more regular.  She notices  less urinary urgency during the day and is only waking up once at night to use the bathroom whereas she was waking up 3-4 times a night before she started the medication.  She is getting much better sleep.  Still feels somewhat fatigued but admits that she has been very busy this summer and has not been as consistent with following a healthy diet, doing Weight Watchers and exercising regularly.  She will be starting school again in a few weeks and then plans to become more focused on her diet and exercise regimen.  She works as a  for students with disabilities.  Reviewed family history.  Her mother has dementia of the Alzheimer's type.  Reviewed medications, allergies and social history.  She is up-to-date on mammogram and colonoscopy.  Vaccines are up-to-date.  States her insurance does not cover the new shingles vaccine.  No other concerns or questions.  Review of systems otherwise negative.    Healthy Habits:   Regular Exercise: Yes  Sunscreen Use: Yes  Healthy Diet: Yes  Dental Visits Regularly: Yes  Seat Belt: Yes  Sexually active: Yes  Self Breast Exam Monthly:Yes  Hemoccults: No  Flex Sig: No  Colonoscopy: Yes  Lipid Profile: Yes  Glucose Screen: Yes  Prevention of Osteoporosis: Yes  Last Dexa: N/A  Guns at Home:  N/A      Immunization History   Administered Date(s) Administered     DT (pediatric) 11/10/1993, 2003     Influenza, inj, historic,unspecified 10/06/2010     Influenza, seasonal,quad inj 6-35 mos 2012, 10/14/2013, 10/01/2014     Td,adult,historic,unspecified 08/10/2009     Tdap 08/10/2009     Immunization status: up to date and documented.      Gynecologic History  No LMP recorded. Patient has had a hysterectomy.  Contraception: status post hysterectomy  Last mammogram: . Results were: normal      OB History    Para Term  AB Living   2 2 2      SAB TAB Ectopic Multiple Live Births             # Outcome Date GA Lbr Brayan/2nd Weight Sex Delivery Anes PTL Lv   2  Term            1 Term                   Current Outpatient Prescriptions   Medication Sig Dispense Refill     calcium carbonate-vitamin D3 (CALCIUM 600 WITH VITAMIN D3) 600 mg(1,500mg) -200 unit per tablet Take 1 tablet by mouth daily.       cholecalciferol, vitamin D3, (VITAMIN D3) 2,000 unit cap Take by mouth daily.       coenzyme Q10 (CO Q-10) 100 mg capsule Take 100 mg by mouth daily. Take as directed.       cranberry 400 mg cap Take 1 tablet by mouth daily.       naproxen (NAPROSYN) 500 MG tablet Take 1 tablet (500 mg total) by mouth 2 (two) times a day as needed. 60 tablet 4     omega-3 fatty acids-vitamin E (FISH OIL) 1,000 mg cap Take by mouth daily.       tolterodine (DETROL LA) 4 MG ER capsule Take 1 capsule (4 mg total) by mouth daily. 90 capsule 3     No current facility-administered medications for this visit.      History reviewed. No pertinent past medical history.  Past Surgical History:   Procedure Laterality Date     HYSTERECTOMY  2002     [facility] TOTAL ABDOM HYSTERECTOMY      Description: Total Abdominal Hysterectomy;  Recorded: 03/28/2013;     Sulfa (sulfonamide antibiotics)  Family History   Problem Relation Age of Onset     Breast cancer Maternal Aunt      Dementia Maternal Aunt      Alzheimers     Breast cancer Maternal Aunt      Dementia Mother      Alzheimer     Dementia Maternal Aunt      Alzheimers     Social History     Social History     Marital status:      Spouse name: N/A     Number of children: N/A     Years of education: N/A     Occupational History     Not on file.     Social History Main Topics     Smoking status: Never Smoker     Smokeless tobacco: Never Used     Alcohol use No     Drug use: Not on file     Sexual activity: Yes     Partners: Male     Birth control/ protection: None     Other Topics Concern     Not on file     Social History Narrative       Review of Systems  General:  Denies problem  Eyes: Denies problem  Ears/Nose/Throat: Denies problem  Cardiovascular:  "Denies problem  Respiratory:  Denies problem  Gastrointestinal:  Denies problem, Genitourinary: Denies problem  Musculoskeletal:  Denies problem  Skin: Denies problem  Neurologic: Denies problem  Psychiatric: Denies problem  Endocrine: Denies problem  Heme/Lymphatic: Denies problem   Allergic/Immunologic: Denies problem        Objective:         /68 (Patient Site: Left Arm, Patient Position: Sitting, Cuff Size: Adult Large)  Pulse 78  Temp 98.3  F (36.8  C) (Oral)   Ht 5' 5\" (1.651 m)  Wt 205 lb 1 oz (93 kg)  SpO2 99%  BMI 34.12 kg/m2      Physical Exam:  General Appearance: Alert, cooperative, no distress, appears stated age  Head: Normocephalic, without obvious abnormality, atraumatic  Eyes: PERRL, conjunctiva/corneas clear, EOM's intact  Ears: Normal TM's and external ear canals, both ears  Nose: Nares normal, septum midline,mucosa normal, no drainage  Throat: Lips, mucosa, and tongue normal; teeth and gums normal  Neck: Supple, symmetrical, trachea midline, no adenopathy;  thyroid: not enlarged, symmetric, no tenderness/mass/nodules;  Back: Symmetric, no curvature, ROM normal  Lungs: Clear to auscultation bilaterally, respirations unlabored  Breasts: No breast masses, tenderness, asymmetry, or nipple discharge.  Heart: Regular rate and rhythm, S1 and S2 normal, no murmur, rub, or gallop, Abdomen: Soft, non-tender, bowel sounds active all four quadrants,  no masses, no organomegaly  Pelvic:Not examined  Extremities: Extremities normal, atraumatic, no cyanosis or edema, bilateral varicose veins (more prominent in thighs)  Skin: Skin color, texture, turgor normal, no rashes or lesions  Lymph nodes: Cervical, supraclavicular, and axillary nodes normal  Neurologic: Normal        "

## 2021-06-19 NOTE — PROGRESS NOTES
Assessment/Plan:     1. Ganglion cyst of joint of finger of left hand  Ambulatory referral to Orthopedic Surgery   2. Urinary frequency  Urinalysis-UC if Indicated    Culture, Urine   3. OA (osteoarthritis)  naproxen (NAPROSYN) 500 MG tablet   4. Overactive bladder         Diagnoses and all orders for this visit:    Ganglion cyst of joint of finger of left hand  -     Ambulatory referral to Orthopedic Surgery    Urinary frequency  -     Urinalysis-UC if Indicated  -     Culture, Urine    OA (osteoarthritis)  -     naproxen (NAPROSYN) 500 MG tablet; Take 1 tablet (500 mg total) by mouth 2 (two) times a day as needed.  Dispense: 60 tablet; Refill: 4    Overactive bladder  Urinalysis was obtained and shows trace leukocytes.  Urine will be sent for culture to rule out UTI.  In the meantime we will trial Detrol LA 4 mg once daily for overactive bladder.  Counseled on use of medication and side effects.  She is planning to schedule a physical within the next couple of months and will follow up on new medication at that time.  She will contact us sooner if any concerns or problems.    Other orders  -     tolterodine (DETROL LA) 4 MG ER capsule; Take 1 capsule (4 mg total) by mouth daily.  Dispense: 30 capsule; Refill: 2           Subjective:      Shahnaz Arias is a 58 y.o. female who comes in today with concern about a growth on her left fourth and fifth finger.  She has history of ganglion cyst on her hands and has had these removed in the past with orthopedic hand specialist.  She thinks that this was done about 6 years ago.  She has had some similar lumps redeveloped on the fingers of her left hand.  The one on her left small finger has been present for a long time but now it is increasing in size and it is causing her more discomfort.  She has a similar appearing lump on the left fourth finger that she has noticed developing over the past 6 months or so.  She would be interested in getting a referral to an  orthopedic specialist to discuss treatment options.  We reviewed medications and allergies.  She requests refill of naproxen for arthritis.  She also reports concern about urinary frequency.  Gets up to use the bathroom 4 or 5 times a night.  This is disrupting her sleep.  She would like to know if there are any medications that she can try to help with her symptoms.  She is not expressing any dysuria, hematuria or odor to the urine.  No fevers or chills.  No back pain or abdominal pain.  She has no other concerns or questions today.  Review of systems is otherwise negative.    Current Outpatient Prescriptions   Medication Sig Dispense Refill     calcium carbonate-vitamin D3 (CALCIUM 600 WITH VITAMIN D3) 600 mg(1,500mg) -200 unit per tablet Take 1 tablet by mouth daily.       cholecalciferol, vitamin D3, (VITAMIN D3) 2,000 unit cap Take by mouth daily.       coenzyme Q10 (CO Q-10) 100 mg capsule Take 100 mg by mouth daily. Take as directed.       cranberry 400 mg cap Take 1 tablet by mouth daily.       naproxen (NAPROSYN) 500 MG tablet Take 1 tablet (500 mg total) by mouth 2 (two) times a day as needed. 60 tablet 4     omega-3 fatty acids-vitamin E (FISH OIL) 1,000 mg cap Take by mouth daily.       tolterodine (DETROL LA) 4 MG ER capsule Take 1 capsule (4 mg total) by mouth daily. 30 capsule 2     No current facility-administered medications for this visit.        Past Medical History, Family History, and Social History reviewed.  No past medical history on file.  Past Surgical History:   Procedure Laterality Date     HYSTERECTOMY  2002     [facility] TOTAL ABDOM HYSTERECTOMY      Description: Total Abdominal Hysterectomy;  Recorded: 03/28/2013;     Sulfa (sulfonamide antibiotics)  Family History   Problem Relation Age of Onset     Breast cancer Maternal Aunt      Breast cancer Maternal Aunt      Social History     Social History     Marital status:      Spouse name: N/A     Number of children: N/A     Years of  "education: N/A     Occupational History     Not on file.     Social History Main Topics     Smoking status: Never Smoker     Smokeless tobacco: Never Used     Alcohol use Yes     Drug use: Not on file     Sexual activity: Yes     Partners: Male     Birth control/ protection: None     Other Topics Concern     Not on file     Social History Narrative         Review of systems is as stated in HPI, and the remainder of the 10 system review is otherwise negative.    Objective:     Vitals:    07/13/18 0940   BP: 121/76   Patient Site: Left Arm   Patient Position: Sitting   Cuff Size: Adult Large   Pulse: 75   Temp: 98.8  F (37.1  C)   TempSrc: Tympanic   SpO2: 97%   Weight: 199 lb 5 oz (90.4 kg)   Height: 5' 5\" (1.651 m)    Body mass index is 33.17 kg/(m^2).    General appearance: alert, appears stated age and cooperative  Extremities: soft tissue lump present near PIP joint left 5th finger and left 4th finger consistent with ganglion cyst    Lab Results   Component Value Date    COLORU Yellow 07/13/2018    CLARITYU Clear 07/13/2018    GLUCOSEU Negative 07/13/2018    BILIRUBINUR Negative 07/13/2018    KETONESU Negative 07/13/2018    SPECGRAV 1.015 07/13/2018    HGBUA Negative 07/13/2018    PHUR 7.0 07/13/2018    PROTEINUA Negative 07/13/2018    UROBILINOGEN 0.2 E.U./dL 07/13/2018    NITRITE Negative 07/13/2018    LEUKOCYTESUR Trace (!) 07/13/2018    BACTERIA None Seen 07/13/2018    RBCUA None Seen 07/13/2018    WBCUA 0-5 07/13/2018    SQUAMEPI 0-5 07/13/2018           This note has been dictated using voice recognition software. Any grammatical or context distortions are unintentional and inherent to the the software.       "

## 2021-06-19 NOTE — PROGRESS NOTES
ASSESSMENT/PLAN:    Cellulitis, chin  Pleasant 58-year-old female presented today for cellulitis of her chin.  I will give her cephalexin to take 4 times a day for 10 days.  She may apply warm compress to the area to help with the wound.  She may also try a cool compress to help with swelling in the pain.  Continue with supportive cares.  Over-the-counter analgesics for pain.  I will up in 2 weeks if she is not better.  She verbalized understanding and agreed with the plan  -     cephalexin (KEFLEX) 500 MG capsule; Take 1 capsule (500 mg total) by mouth 4 (four) times a day for 10 days.    SUBJECTIVE:    Shahnaz Arias is a 58 y.o. female who came in today for swelling and pain of her chin.  She was in her normal state of health when she noted 2 days ago soreness of the chin.  It was this morning that she noted a bump.  She was applying warm compress and was able to express clear material.  Since then it has gotten swollen, red, warm to touch, and tender to touch.  She denied fever, chills, myalgia, malaise.  She does not have recurrent cellulitis or skin infection.  She is allergic to sulfa.    Review of Systems (except those mentioned above)  Constitutional: Negative.   HENT: Negative.   Eyes: Negative.   Respiratory: Negative.   Cardiovascular: Negative.   Gastrointestinal: Negative.   Endocrine: Negative.   Genitourinary: Negative.   Musculoskeletal: Negative.   Skin: Negative.   Allergic/Immunologic: Negative.   Neurological: Negative.   Hematological: Negative.   Psychiatric/Behavioral: Negative.     Patient Active Problem List    Diagnosis Date Noted     Lumbar strain 12/21/2014     Arthropathy Of Multiple Sites      Benign Essential Hypertension      Allergies   Allergen Reactions     Sulfa (Sulfonamide Antibiotics)      Current Outpatient Prescriptions   Medication Sig Dispense Refill     B INFANTIS/B ANI/B EN/B BIFID (PROBIOTIC 4X ORAL) Take by mouth.       calcium carbonate-vitamin D3 (CALCIUM 600 WITH  VITAMIN D3) 600 mg(1,500mg) -200 unit per tablet Take 1 tablet by mouth daily.       cholecalciferol, vitamin D3, (VITAMIN D3) 2,000 unit cap Take by mouth daily.       coenzyme Q10 (CO Q-10) 100 mg capsule Take 100 mg by mouth daily. Take as directed.       cranberry 400 mg cap Take 1 tablet by mouth daily.       naproxen (NAPROSYN) 500 MG tablet Take 1 tablet (500 mg total) by mouth 2 (two) times a day as needed. 60 tablet 4     omega-3 fatty acids-vitamin E (FISH OIL) 1,000 mg cap Take by mouth daily.       cephalexin (KEFLEX) 500 MG capsule Take 1 capsule (500 mg total) by mouth 4 (four) times a day for 10 days. 40 capsule 0     No current facility-administered medications for this visit.      No past medical history on file.  Past Surgical History:   Procedure Laterality Date     HYSTERECTOMY  2002     [facility] TOTAL ABDOM HYSTERECTOMY      Description: Total Abdominal Hysterectomy;  Recorded: 03/28/2013;     Social History     Social History     Marital status:      Spouse name: N/A     Number of children: N/A     Years of education: N/A     Social History Main Topics     Smoking status: Never Smoker     Smokeless tobacco: Never Used     Alcohol use Yes     Drug use: None     Sexual activity: Yes     Partners: Male     Birth control/ protection: None     Other Topics Concern     None     Social History Narrative     Family History   Problem Relation Age of Onset     Breast cancer Maternal Aunt          OBJECTIVE:    Vitals:    06/28/18 1440   BP: 124/82   Pulse: 74   Weight: 204 lb (92.5 kg)     Body mass index is 33.95 kg/(m^2).    Physical Exam:  Constitutional: Patient was oriented to person, place, and time. Patient appeared well-developed and well-nourished. No distress.   Skin: Skin on the left side of her chin appeared swollen and red.  It is warm and tender to touch.  There is a central area of skin breakage but it is dry without any discharge.  No cervical or supraclavicular lymph node  involvement.

## 2021-06-21 ENCOUNTER — COMMUNICATION - HEALTHEAST (OUTPATIENT)
Dept: FAMILY MEDICINE | Facility: CLINIC | Age: 61
End: 2021-06-21

## 2021-06-22 ENCOUNTER — COMMUNICATION - HEALTHEAST (OUTPATIENT)
Dept: SCHEDULING | Facility: CLINIC | Age: 61
End: 2021-06-22

## 2021-06-23 ENCOUNTER — COMMUNICATION - HEALTHEAST (OUTPATIENT)
Dept: FAMILY MEDICINE | Facility: CLINIC | Age: 61
End: 2021-06-23

## 2021-06-23 DIAGNOSIS — J01.00 ACUTE NON-RECURRENT MAXILLARY SINUSITIS: ICD-10-CM

## 2021-06-26 ENCOUNTER — HEALTH MAINTENANCE LETTER (OUTPATIENT)
Age: 61
End: 2021-06-26

## 2021-06-26 NOTE — TELEPHONE ENCOUNTER
Called pt and relayed dr neves msg. She stated she went to have a covid test done elsewhere today. Told her to call back if anything changes.

## 2021-06-26 NOTE — TELEPHONE ENCOUNTER
Reason for Call:  Other prescription     Detailed comments: Patient was seen yesterday and was prescribed amoxicillin to help with her symptoms. In the middle of the night she woke up with a fever, headache, diarhrea and a rash on her neck. Patient said that she believes to be having an allergic reaction to the medication and would like to have an alternative antibiotic sent to the pharmacy if appropriate.     Patient would like a confirmation call if/when a different prescription is sent to pharmacy.    Please advise.    Phone Number Patient can be reached at: Cell number on file:    Telephone Information:   Mobile [phone]       Best Time: anytime    Can we leave a detailed message on this number?: Yes    Call taken on 6/23/2021 at 8:45 AM by Carissa Rollins

## 2021-06-26 NOTE — TELEPHONE ENCOUNTER
Pt called in states she has sinus problem.  The pain is on er nose and cheek area.  The pain started 6 days ago.  The pain is 6/10 on the scale.  Pt is also congested and has cough.  Has nasal discharge.  No fever.  little sore throat, no ear ache.  Pt tested for covid-19 yesterday and the result is negative.  Pt is not vaccinated for covid-19.  Only when coughing feels little difficulty breathing.    The disposition is to be seen with in the next 3 days.  Phone appointment is made for the Pt.  Care advice given per protocol.  Patient agrees with care advice given.   Agreed to call back if he has additional symptoms or questions.        Timoteo Loyd RN, [facility] Triage/Med Refill 6/22/2021 1:56 PM        Reason for Disposition    Lots of coughing    Additional Information    Negative: Severe difficulty breathing (e.g., struggling for each breath, speaks in single words)    Negative: Sounds like a life-threatening emergency to the triager    Negative: [1] Sinus infection AND [2] taking an antibiotic AND [3] symptoms continue    Negative: [1] Difficulty breathing AND [2] not from stuffy nose (e.g., not relieved by cleaning out the nose)    Negative: [1] SEVERE headache AND [2] fever    Negative: [1] Redness or swelling on the cheek, forehead or around the eye AND [2] fever    Negative: Fever > 104 F (40 C)    Negative: Patient sounds very sick or weak to the triager    Negative: [1] SEVERE pain AND [2] not improved 2 hours after pain medicine    Negative: [1] Redness or swelling on the cheek, forehead or around the eye AND [2] no fever    Negative: [1] Fever > 101 F (38.3 C) AND [2] age > 60    Negative: [1] Fever > 100.0 F (37.8 C) AND [2] bedridden (e.g., nursing home patient, CVA, chronic illness, recovering from surgery)    Negative: [1] Fever > 100.0 F (37.8 C) AND [2] diabetes mellitus or weak immune system (e.g., HIV positive, cancer chemo, splenectomy, organ transplant, chronic steroids)    Negative:  [1] Using nasal washes and pain medicine > 24 hours AND [2] sinus pain (around cheekbone or eye) persists    Negative: [1] Nasal discharge AND [2] present > 10 days    Negative: [1] Sinus congestion (pressure, fullness) AND [2] present > 10 days    Negative: Fever present > 3 days (72 hours)    Negative: [1] Fever returns after gone for over 24 hours AND [2] symptoms worse or not improved    Negative: [1] Sinus pain (not just congestion) AND [2] fever    Negative: Earache    Protocols used: SINUS PAIN OR CONGESTION-A-AH

## 2021-06-26 NOTE — TELEPHONE ENCOUNTER
Reason for Call:  Other Order     Detailed comments: Patient was calling to have an order placed so she can get a Covid test. Patient was having symptoms such as fever, sore throat and no smell with a stuffy nose and phlegmy coughs. Patient said that it started about 5 days ago. Please place order if appropriate and once order has been place patient wanted a call back so she can schedule.     Phone Number Patient can be reached at: Cell number on file:    Telephone Information:   Mobile [phone]       Best Time: anytime     Can we leave a detailed message on this number?: Yes    Call taken on 6/21/2021 at 9:03 AM by Carissa Rollins

## 2021-06-26 NOTE — TELEPHONE ENCOUNTER
Please have patient make a telephone visit to discuss testing in more detail and review symptoms, quaranting, and ER precautions. Can place order then if appropriate.    Martin Hernandez MD

## 2021-06-28 NOTE — PROGRESS NOTES
Progress Notes by Hai Prater MD at 11/26/2019  8:00 AM     Author: Hai Prater MD Service: -- Author Type: Physician    Filed: 11/26/2019  9:41 AM Encounter Date: 11/26/2019 Status: Addendum    : Hai Prater MD (Physician)    Related Notes: Original Note by Hai Prater MD (Physician) filed at 11/26/2019  9:35 AM       Follow Up: Varicose Veins/ Venous Insufficiency    Shahnaz Arias is a 59 y.o.  female here for followup. She has worn stockings now for 3 months. I saw them previously in July 2019.  Continued progression of disease and symptoms and issues; reviewed ultrasound results. Patient has ongoing symptoms with pain and swelling needing intervention with pain meds secondary to interfering with daily activities and work. This now inhibits daily chores and activities.     Allergies:Sulfa (sulfonamide antibiotics)    History reviewed. No pertinent past medical history.    Past Surgical History:   Procedure Laterality Date   ? HYSTERECTOMY  2002   ? NC TOTAL ABDOM HYSTERECTOMY      Description: Total Abdominal Hysterectomy;  Recorded: 03/28/2013;   ? VEIN LIGATION AND STRIPPING Bilateral        CURRENT MEDS:  Current Outpatient Medications on File Prior to Visit   Medication Sig Dispense Refill   ? calcium carbonate-vitamin D3 (CALCIUM 600 WITH VITAMIN D3) 600 mg(1,500mg) -200 unit per tablet Take 1 tablet by mouth daily.     ? cholecalciferol, vitamin D3, (VITAMIN D3) 2,000 unit cap Take by mouth daily.     ? coenzyme Q10 (CO Q-10) 100 mg capsule Take 100 mg by mouth daily. Take as directed.     ? cranberry 400 mg cap Take 1 tablet by mouth daily.     ? naproxen (NAPROSYN) 500 MG tablet Take 1 tablet (500 mg total) by mouth 2 (two) times a day as needed. 60 tablet 4   ? omega-3 fatty acids-vitamin E (FISH OIL) 1,000 mg cap Take by mouth daily.     ? oxybutynin (DITROPAN XL) 5 MG ER tablet Take 1 tablet (5 mg total) by mouth daily. 90 tablet 3     No current facility-administered  "medications on file prior to visit.        Family History   Problem Relation Age of Onset   ? Breast cancer Maternal Aunt    ? Dementia Maternal Aunt         Alzheimers   ? Breast cancer Maternal Aunt    ? Dementia Mother         Alzheimer   ? Varicose Veins Mother    ? Dementia Maternal Aunt         Alzheimers   ? Varicose Veins Sister    ? Varicose Veins Brother         reports that she has never smoked. She has never used smokeless tobacco. She reports that she does not drink alcohol or use drugs.    Review of Systems:  Negative except symptomatic varicosities and veins.  These are bothersome of both legs more so significantly on her upper legs.  This is where she has most trouble on her ultrasound she is had stripping done many many many years ago.  She has varicose veins and spider veins on both legs and these are both symptomatic and problematic for her.  Most problems with his big dilated veins are upper thigh region.  He has been wearing socks for 3 months time.  Continued pain symptoms she rates her pain a 6 out of 10.  She has cramping problems trouble and troubles afterwards she also notices if she when she takes her socks off she has trouble at night when she gets her legs up initially unsterile for some time plan to do help and get better so she can sleep.  But this is getting worse over time and she like to see take an option of intervention if she could.  She she has trouble and she stands for long weeks of time such as chores such as vacuuming or washing dishes.  Is normal state of health    OBJECTIVE:  Vitals:    11/26/19 0812   BP: 122/80   Pulse: 76   Resp: 16   Temp: 98.3  F (36.8  C)   TempSrc: Oral   SpO2: 96%   Weight: 217 lb (98.4 kg)   Height: 5' 5\" (1.651 m)     Body mass index is 36.11 kg/m .    EXAM:  GENERAL: This is a well-developed 59 y.o. female who appears her stated age  HEAD: normocephalic  HEENT: Pupils equal and reactive bilaterally  CARDIAC: RRR without murmur  CHEST/LUNG:  " Clear to auscultation  ABDOMEN: Soft, nontender, nondistended, no masses    NEUROLOGIC: Focally intact, nonfocal  VASCULAR: Pulses intact, symmetrical upper and lower extremities. Varicose veins, Spider veins and Chronic venous stasis changes, bilateral                Imaging:    US Venous Insufficiency Legs Bilateral (Order [id number])   Imaging   Date: 7/9/2019 Department: [facility] Ultrasound [place] Released By: Medhat Ron RDMS, RVT Authorizing: Hai Prater MD   Study Result     [facility] OUTPATIENT     DATE: 7/9/2019     EXAM: BILATERAL LOWER EXTREMITY DEEP AND SUPERFICIAL VENOUS DUPLEX ULTRASOUND WITH PHYSIOLOGIC TESTING     INDICATION: Symptomatic varicose veins. Assess for incompetent veins.     TECHNIQUE: Supine and upright ultrasound of the deep and superficial veins with Valsalva and compression augmentation maneuvers. Duplex imaging is performed utilizing gray-scale, two-dimensional images, color-flow imaging, Doppler waveform analysis, and   spectral Doppler imaging.      INCOMPETENCY CRITERIA: Deep vein reflux reported when greater than 1,000 ms flow reversal.  Superficial vein reflux reported when greater than 500 ms flow reversal.  vein reflux reported as greater than 350 ms flow reversal.     DEEP VEIN FINDINGS:     RIGHT LEG: The common femoral, profunda femoral, femoral, popliteal, and visualized calf veins are patent and compressible.  Deep venous reflux noted within the common femoral, upper femoral, and popliteal veins.     LEFT LEG:  The common femoral, profunda femoral, femoral, popliteal, and visualized calf veins are patent and compressible.   Deep venous reflux noted within the common femoral and upper femoral veins.     RIGHT SUPERFICIAL VEIN FINDINGS:  GREAT SAPHENOUS VEIN: Nonvisualization of portions of the greater saphenous vein. Superficial venous reflux noted within the saphenofemoral junction. The vein measures 8 mm within this  distribution.     SMALL SAPHENOUS VEIN: Superficial venous reflux within the small saphenous vein at the level of the proximal calf. The vein measures 5 mm within this distribution.     The anterior accessory saphenous vein is incompetent measuring 8 mm in diameter. A cluster of varicose veins is noted within the right thigh measuring 6 mm in diameter with reflux noted.     LEFT SUPERFICIAL VEIN FINDINGS:  GREAT SAPHENOUS VEIN: Portions of the greater saphenous vein are not well visualized. Superficial venous reflux at the level the saphenofemoral junction. The vein measures 6 mm within this distribution.     SMALL SAPHENOUS VEIN: Superficial venous reflux within the small saphenous vein at the level the proximal calf. The vein measures 5 mm within this distribution.     The anterior accessory saphenous vein is incompetent measuring 4-6 mm. A varicose vein branch measures approximately 7 mm in diameter and is incompetent, located within the proximal thigh.     IMPRESSION:   CONCLUSION:   1.  No deep venous thrombosis of either lower extremity.  2.  RIGHT LEG: Deep and superficial venous reflux, as described.  3.  LEFT LEG: Deep and superficial venous reflux, as described.         Assessment/Plan:    She has  incompetency and insuffiencey of the Bilateral  Accessory  saphenous vein.  Deep systems are intact. No DVTs. Great candidate for endovenous  closure. We spent counseling time more than 50% of visit: 20 minutes today and discussed the procedure. The risks of anesthesia, infection, bleeding, clotting, DVTs, numbess at the insertion site dermatome and  the process and procedure were discussed. Answered all questions today. Will submit this to [employer] insurance if needed for pre approval.Will set this up when approved. Discussed need to have a  and procedure woul take around 30 minutes with total time 2-3 hours. Also understands a small screening ultrasound 2-3 days out to rule out clot formation at  the closed vessel.    DIAGNOSIS: Venous insufficiency of the  right anterior accessory saphenous vein and  left anterior accessory vein      PROCEDURE: Endovenous closure of the right anterior accessory saphenous vein and  left anterior accessory vein    Hai Prater MD  [facility] Surgery Dept.

## 2021-06-30 NOTE — PROGRESS NOTES
Progress Notes by Hai Prater MD at 1/20/2021  8:20 AM     Author: Hai Prater MD Service: -- Author Type: Physician    Filed: 1/20/2021  8:35 AM Encounter Date: 1/20/2021 Status: Signed    : Hai Prater MD (Physician)       [facility] Surgery Follow up  Video Visit  Start: 8:20   End: 8:30    HPI:    60 y.o. year old female who returns for a follow up.  Patient is here for a follow-up today with this video visit she had a closure procedure 4 months ago she has done really well she has had some phlebitis and clotted off her whole branch which was quite concerning at the time but now she is really happy the results is all softened up the clot is gone away and her vision is gone away.  She says she occasionally still some achiness in a day where she is up all day or doing something abnormal from her normal routine but is pretty happy the results.    Allergies:Sulfa (sulfonamide antibiotics)    Past Medical History:   Diagnosis Date   ? Arthropathy Of Multiple Sites     Created by Conversion  Replacement Utility updated for latest IMO load       Past Surgical History:   Procedure Laterality Date   ? HYSTERECTOMY  2002   ? [place] TOTAL ABDOM HYSTERECTOMY      Description: Total Abdominal Hysterectomy;  Recorded: 03/28/2013;   ? VEIN LIGATION AND STRIPPING Bilateral        CURRENT MEDS:  Current Outpatient Medications on File Prior to Visit   Medication Sig Dispense Refill   ? ascorbic acid, vitamin C, (ASCORBIC ACID WITH KEYA HIPS) 500 MG tablet Take 500 mg by mouth daily.     ? calcium carbonate-vitamin D3 (CALCIUM 600 WITH VITAMIN D3) 600 mg(1,500mg) -200 unit per tablet Take 1 tablet by mouth daily.     ? cholecalciferol, vitamin D3, (VITAMIN D3) 2,000 unit cap Take by mouth daily.     ? coenzyme Q10 (CO Q-10) 100 mg capsule Take 100 mg by mouth daily. Take as directed.     ? cranberry 400 mg cap Take 1 tablet by mouth daily.     ? lisinopriL (PRINIVIL,ZESTRIL) 10 MG tablet Take 1 tablet (10 mg  total) by mouth daily. 90 tablet 3   ? naproxen (NAPROSYN) 500 MG tablet Take 1 tablet (500 mg total) by mouth 2 (two) times a day as needed. 60 tablet 4   ? omega-3 fatty acids-vitamin E (FISH OIL) 1,000 mg cap Take by mouth daily.       No current facility-administered medications on file prior to visit.        Family History   Problem Relation Age of Onset   ? Breast cancer Maternal Aunt    ? Dementia Maternal Aunt         Alzheimers   ? Breast cancer Maternal Aunt    ? Dementia Mother         Alzheimer   ? Varicose Veins Mother    ? Dementia Maternal Aunt         Alzheimers   ? Varicose Veins Sister    ? Varicose Veins Brother         reports that she has never smoked. She has never used smokeless tobacco. She reports that she does not drink alcohol or use drugs.    Review of Systems:  Negative except vein history in the past she is undergone stab phlebectomy stripping vein ligation and also now this clip closure procedure 4 months ago.  Otherwise normal state of health    OBJECTIVE:  There were no vitals filed for this visit.  There is no height or weight on file to calculate BMI.    EXAM:  GENERAL: This is a well-developed 60 y.o. female who appears her stated age  HEAD: normocephalic    The rest of the exam not done secondary to video visit and covid n19 restrictions.                                                                          LABS:  Lab Results   Component Value Date    WBC 4.7 08/06/2018    HGB 13.6 10/05/2020    HCT 39.1 08/06/2018    MCV 87 08/06/2018     08/06/2018     INR/Prothrombin Time      No results found for: HGBA1C  Lab Results   Component Value Date    ALT 17 10/05/2020    AST 22 10/05/2020    ALKPHOS 93 10/05/2020    BILITOT 1.1 (H) 10/05/2020        Assessment/Plan:   Status post closure 4 months ago she is doing well post procedure no major complaints problems or issues and she is pretty satisfied with the results at this time point she continues her compression socks and  to work on exercise and weight control.  We discussed that we can first renew her socks for least another year at any time point at this time point she defers a get a new prescription of any consider here this summer.    Discussed and answered all questions with her today.     Hai Prater MD  [facility] Department of Surgery

## 2021-07-13 ENCOUNTER — RECORDS - HEALTHEAST (OUTPATIENT)
Dept: ADMINISTRATIVE | Facility: CLINIC | Age: 61
End: 2021-07-13

## 2021-07-21 ENCOUNTER — RECORDS - HEALTHEAST (OUTPATIENT)
Dept: ADMINISTRATIVE | Facility: CLINIC | Age: 61
End: 2021-07-21

## 2021-07-22 ENCOUNTER — RECORDS - HEALTHEAST (OUTPATIENT)
Dept: FAMILY MEDICINE | Facility: CLINIC | Age: 61
End: 2021-07-22

## 2021-07-22 DIAGNOSIS — Z12.31 OTHER SCREENING MAMMOGRAM: ICD-10-CM

## 2021-10-16 ENCOUNTER — HEALTH MAINTENANCE LETTER (OUTPATIENT)
Age: 61
End: 2021-10-16

## 2021-10-25 ENCOUNTER — OFFICE VISIT (OUTPATIENT)
Dept: FAMILY MEDICINE | Facility: CLINIC | Age: 61
End: 2021-10-25
Payer: COMMERCIAL

## 2021-10-25 VITALS
SYSTOLIC BLOOD PRESSURE: 126 MMHG | BODY MASS INDEX: 38.07 KG/M2 | HEIGHT: 66 IN | DIASTOLIC BLOOD PRESSURE: 80 MMHG | WEIGHT: 236.9 LBS

## 2021-10-25 DIAGNOSIS — I10 BENIGN ESSENTIAL HYPERTENSION: ICD-10-CM

## 2021-10-25 DIAGNOSIS — Z12.31 VISIT FOR SCREENING MAMMOGRAM: ICD-10-CM

## 2021-10-25 DIAGNOSIS — Z00.00 ROUTINE HISTORY AND PHYSICAL EXAMINATION OF ADULT: Primary | ICD-10-CM

## 2021-10-25 LAB
ALBUMIN SERPL-MCNC: 4 G/DL (ref 3.5–5)
ALP SERPL-CCNC: 87 U/L (ref 45–120)
ALT SERPL W P-5'-P-CCNC: 15 U/L (ref 0–45)
ANION GAP SERPL CALCULATED.3IONS-SCNC: 8 MMOL/L (ref 5–18)
AST SERPL W P-5'-P-CCNC: 21 U/L (ref 0–40)
BILIRUB SERPL-MCNC: 0.8 MG/DL (ref 0–1)
BUN SERPL-MCNC: 16 MG/DL (ref 8–22)
CALCIUM SERPL-MCNC: 9.3 MG/DL (ref 8.5–10.5)
CHLORIDE BLD-SCNC: 106 MMOL/L (ref 98–107)
CHOLEST SERPL-MCNC: 200 MG/DL
CO2 SERPL-SCNC: 25 MMOL/L (ref 22–31)
CREAT SERPL-MCNC: 0.78 MG/DL (ref 0.6–1.1)
FASTING STATUS PATIENT QL REPORTED: YES
GFR SERPL CREATININE-BSD FRML MDRD: 83 ML/MIN/1.73M2
GLUCOSE BLD-MCNC: 91 MG/DL (ref 70–125)
HDLC SERPL-MCNC: 53 MG/DL
HGB BLD-MCNC: 13.6 G/DL (ref 11.7–15.7)
LDLC SERPL CALC-MCNC: 121 MG/DL
POTASSIUM BLD-SCNC: 3.9 MMOL/L (ref 3.5–5)
PROT SERPL-MCNC: 7.4 G/DL (ref 6–8)
SODIUM SERPL-SCNC: 139 MMOL/L (ref 136–145)
TRIGL SERPL-MCNC: 132 MG/DL

## 2021-10-25 PROCEDURE — 90682 RIV4 VACC RECOMBINANT DNA IM: CPT | Performed by: FAMILY MEDICINE

## 2021-10-25 PROCEDURE — 99396 PREV VISIT EST AGE 40-64: CPT | Mod: 25 | Performed by: FAMILY MEDICINE

## 2021-10-25 PROCEDURE — 80061 LIPID PANEL: CPT | Performed by: FAMILY MEDICINE

## 2021-10-25 PROCEDURE — 85018 HEMOGLOBIN: CPT | Performed by: FAMILY MEDICINE

## 2021-10-25 PROCEDURE — 90471 IMMUNIZATION ADMIN: CPT | Performed by: FAMILY MEDICINE

## 2021-10-25 PROCEDURE — 80053 COMPREHEN METABOLIC PANEL: CPT | Performed by: FAMILY MEDICINE

## 2021-10-25 PROCEDURE — 36415 COLL VENOUS BLD VENIPUNCTURE: CPT | Performed by: FAMILY MEDICINE

## 2021-10-25 RX ORDER — LISINOPRIL 10 MG/1
TABLET ORAL
Qty: 90 TABLET | Refills: 3 | Status: SHIPPED | OUTPATIENT
Start: 2021-10-25 | End: 2022-11-21

## 2021-10-25 ASSESSMENT — MIFFLIN-ST. JEOR: SCORE: 1659.19

## 2021-10-25 NOTE — PROGRESS NOTES
Answers for HPI/ROS submitted by the patient on 10/25/2021  Frequency of exercise:: 6-7 days/week  Getting at least 3 servings of Calcium per day:: Yes  Diet:: Low salt  Taking medications regularly:: Yes  Medication side effects:: None  Bi-annual eye exam:: Yes  Dental care twice a year:: Yes  Sleep apnea or symptoms of sleep apnea:: None  Additional concerns today:: No  Duration of exercise:: 30-45 minutes

## 2021-10-25 NOTE — PROGRESS NOTES
SUBJECTIVE:   CC: Shahnaz Arias is an 61 year old woman who presents for preventive health visit.       Patient has been advised of split billing requirements and indicates understanding: Yes  HPI    She is here today for physical.  No other concerns or questions.  Reviewed her health history including medications, allergies, family and social history and updated the chart.  She is on lisinopril for hypertension.  This was started at her last visit.  Tolerating this well.  Blood pressure is controlled.  No concerning side effects.  She is working on lifestyle changes and weight loss.  She gets regular vision and dental exams.  Admits to some stress associated with her mother who has advanced dementia.  She works full-time during the week and then cares for her mother on the weekends.  Otherwise review of systems is assessed and is negative.  No other concerns or questions today.        Today's PHQ-2 Score:   PHQ-2 ( 1999 [employer]) 10/25/2021   Q1: Little interest or pleasure in doing things 0   Q2: Feeling down, depressed or hopeless 0   PHQ-2 Score 0   Q1: Little interest or pleasure in doing things Not at all   Q2: Feeling down, depressed or hopeless Not at all   PHQ-2 Score 0         Social History     Tobacco Use     Smoking status: Never Smoker     Smokeless tobacco: Never Used   Substance Use Topics     Alcohol use: No         Alcohol Use 10/25/2021   Prescreen: >3 drinks/day or >7 drinks/week? Not Applicable       Reviewed orders with patient.  Reviewed health maintenance and updated orders accordingly - Yes  Current Outpatient Medications   Medication Sig Dispense Refill     ascorbic acid, vitamin C, (ASCORBIC ACID WITH KEYA HIPS) 500 MG tablet [ASCORBIC ACID, VITAMIN C, (ASCORBIC ACID WITH KEYA HIPS) 500 MG TABLET] Take 500 mg by mouth daily.       calcium carbonate-vitamin D3 (CALCIUM 600 WITH VITAMIN D3) 600 mg(1,500mg) -200 unit per tablet [CALCIUM CARBONATE-VITAMIN D3 (CALCIUM 600 WITH VITAMIN D3) 600  MG(1,500MG) -200 UNIT PER TABLET] Take 1 tablet by mouth daily.       cholecalciferol, vitamin D3, (VITAMIN D3) 2,000 unit cap [CHOLECALCIFEROL, VITAMIN D3, (VITAMIN D3) 2,000 UNIT CAP] Take by mouth daily.       coenzyme Q10 (CO Q-10) 100 mg capsule [COENZYME Q10 (CO Q-10) 100 MG CAPSULE] Take 100 mg by mouth daily. Take as directed.       cranberry 400 mg cap [CRANBERRY 400 MG CAP] Take 1 tablet by mouth daily.       lisinopril (ZESTRIL) 10 MG tablet Take 1 tablet (10 mg total) by mouth daily. 90 tablet 3     MEDICATION CANNOT BE REORDERED - PLEASE MANUALLY REORDER AND DISCONTINUE THE OLD ORDER [OMEGA-3 FATTY ACIDS-VITAMIN E (FISH OIL) 1,000 MG CAP] Take by mouth daily.       naproxen (NAPROSYN) 500 MG tablet [NAPROXEN (NAPROSYN) 500 MG TABLET] Take 1 tablet (500 mg total) by mouth 2 (two) times a day as needed. (Patient not taking: Reported on 10/25/2021) 60 tablet 4       Breast Cancer Screening:  Any new diagnosis of family breast, ovarian, or bowel cancer? No    FHS-7: No flowsheet data found.    Mammogram Screening: Recommended mammography every 1-2 years with patient discussion and risk factor consideration  Pertinent mammograms are reviewed under the imaging tab.    History of abnormal Pap smear: Status post benign hysterectomy. Health Maintenance and Surgical History updated.     Reviewed and updated as needed this visit by clinical staff   Allergies  Meds              Reviewed and updated as needed this visit by Provider   Allergies  Meds                 Review of Systems  CONSTITUTIONAL: NEGATIVE for fever, chills, change in weight  INTEGUMENTARY/SKIN: NEGATIVE for worrisome rashes, moles or lesions  EYES: NEGATIVE for vision changes or irritation  ENT: NEGATIVE for ear, mouth and throat problems  RESP: NEGATIVE for significant cough or SOB  BREAST: NEGATIVE for masses, tenderness or discharge  CV: NEGATIVE for chest pain, palpitations or peripheral edema  GI: NEGATIVE for nausea, abdominal pain,  "heartburn, or change in bowel habits  : NEGATIVE for unusual urinary or vaginal symptoms. No vaginal bleeding.  MUSCULOSKELETAL: NEGATIVE for significant arthralgias or myalgia  NEURO: NEGATIVE for weakness, dizziness or paresthesias  PSYCHIATRIC: NEGATIVE for changes in mood or affect      OBJECTIVE:   /80 (BP Location: Left arm, Patient Position: Sitting, Cuff Size: Adult Regular)   Ht 1.665 m (5' 5.55\")   Wt 107.5 kg (236 lb 14.4 oz)   BMI 38.76 kg/m    Physical Exam  GENERAL APPEARANCE: healthy, alert and no distress  EYES: Eyes grossly normal to inspection, PERRL and conjunctivae and sclerae normal  Ears: TMs normal bilaterally  RESP: lungs clear to auscultation - no rales, rhonchi or wheezes  BREAST: normal without masses, tenderness or nipple discharge and no palpable axillary masses or adenopathy  CV: regular rate and rhythm, normal S1 S2, no S3 or S4, no murmur, click or rub, no peripheral edema and peripheral pulses strong  ABDOMEN: soft, nontender, no hepatosplenomegaly, no masses and bowel sounds normal  MS: no musculoskeletal defects are noted and gait is age appropriate without ataxia  SKIN: no suspicious lesions or rashes  NEURO: Normal strength and tone, sensory exam grossly normal, mentation intact and speech normal  PSYCH: mentation appears normal and affect normal/bright        ASSESSMENT/PLAN:   Shahnaz was seen today for physical.    Diagnoses and all orders for this visit:    Routine history and physical examination of adult  -     Lipid panel reflex to direct LDL Fasting; Future  -     Hemoglobin; Future  -Influenza vaccine administered.  Discussed the shingles vaccine and encouraged her to check with insurance on coverage.  Continue with regular vision and dental exams.  No Paps needed as she had hysterectomy for benign reasons.  Order placed for mammogram.  Colonoscopy will be due in 2024.  Encouraged regular exercise, healthy diet and weight loss efforts.  Follow-up in 1 year for " "annual physical    Benign essential hypertension  -     lisinopril (ZESTRIL) 10 MG tablet; Take 1 tablet (10 mg total) by mouth daily.  -     Lipid panel reflex to direct LDL Fasting; Future  -     Comprehensive metabolic panel (BMP + Alb, Alk Phos, ALT, AST, Total. Bili, TP); Future  -Controlled with current medication.  Goal blood pressure is less than 140/90.  Continue weight loss efforts, dietary changes and regular physical activity.    Visit for screening mammogram  -     *MA Screening Digital Bilateral; Future        Patient has been advised of split billing requirements and indicates understanding: Yes  COUNSELING:  Reviewed preventive health counseling, as reflected in patient instructions       Regular exercise       Healthy diet/nutrition       Vision screening       Hearing screening       Colon cancer screening    Estimated body mass index is 38.76 kg/m  as calculated from the following:    Height as of this encounter: 1.665 m (5' 5.55\").    Weight as of this encounter: 107.5 kg (236 lb 14.4 oz).    Weight management plan: Discussed healthy diet and exercise guidelines    She reports that she has never smoked. She has never used smokeless tobacco.      Counseling Resources:  ATP IV Guidelines  Pooled Cohorts Equation Calculator  Breast Cancer Risk Calculator  BRCA-Related Cancer Risk Assessment: FHS-7 Tool  FRAX Risk Assessment  ICSI Preventive Guidelines  Dietary Guidelines for Americans, 2010  USDA's MyPlate  ASA Prophylaxis  Lung CA Screening    Cyn Garrido MD  [facility]  "

## 2021-12-07 ENCOUNTER — TRANSFERRED RECORDS (OUTPATIENT)
Dept: HEALTH INFORMATION MANAGEMENT | Facility: CLINIC | Age: 61
End: 2021-12-07
Payer: COMMERCIAL

## 2021-12-11 ENCOUNTER — HEALTH MAINTENANCE LETTER (OUTPATIENT)
Age: 61
End: 2021-12-11

## 2021-12-21 ENCOUNTER — TRANSFERRED RECORDS (OUTPATIENT)
Dept: HEALTH INFORMATION MANAGEMENT | Facility: CLINIC | Age: 61
End: 2021-12-21
Payer: COMMERCIAL

## 2022-01-05 ENCOUNTER — ANCILLARY PROCEDURE (OUTPATIENT)
Dept: MAMMOGRAPHY | Facility: CLINIC | Age: 62
End: 2022-01-05
Attending: FAMILY MEDICINE
Payer: COMMERCIAL

## 2022-01-05 DIAGNOSIS — Z12.31 VISIT FOR SCREENING MAMMOGRAM: ICD-10-CM

## 2022-01-05 PROCEDURE — 77067 SCR MAMMO BI INCL CAD: CPT

## 2022-10-01 ENCOUNTER — HEALTH MAINTENANCE LETTER (OUTPATIENT)
Age: 62
End: 2022-10-01

## 2022-10-17 ENCOUNTER — NURSE TRIAGE (OUTPATIENT)
Dept: NURSING | Facility: CLINIC | Age: 62
End: 2022-10-17

## 2022-10-17 ENCOUNTER — VIRTUAL VISIT (OUTPATIENT)
Dept: URGENT CARE | Facility: CLINIC | Age: 62
End: 2022-10-17
Payer: COMMERCIAL

## 2022-10-17 DIAGNOSIS — U07.1 CLINICAL DIAGNOSIS OF COVID-19: Primary | ICD-10-CM

## 2022-10-17 PROCEDURE — 99213 OFFICE O/P EST LOW 20 MIN: CPT | Mod: CS

## 2022-10-17 NOTE — TELEPHONE ENCOUNTER
Coronavirus (COVID-19) Notification    Caller Name (Patient, parent, daughter/son, grandparent, etc)  Patient    Reason for call  Patient calling after testing positive for Covid yesterday with an At-Home Covid test. Patient is asking about treatment options as she also has hypertension. Discussed having a virtual visit with a provider to discuss treatment options.    Gather patient reported symptoms   Assessment   Current Symptoms at time of phone call, reported by patient: (if no symptoms, document No symptoms] Fever, sore throat, body aches, stiff neck   Date of Symptom(s) onset (if applicable) 10/14/2022     If at time of call, Patients symptoms hare worsened, the Patient should contact 911 or have someone drive them to Emergency Dept promptly:      If Patient calling 911, inform 911 personal that you have tested positive for the Coronavirus (COVID-19).  Place mask on and await 911 to arrive.    If Emergency Dept, If possible, please have another adult drive you to the Emergency Dept but you need to wear mask when in contact with other people.      Monoclonal Antibody Administration    You may be eligible to receive a new treatment with a monoclonal antibody for preventing hospitalization in patients at high risk for complications from COVID-19. This medication is still experimental and available on a limited basis; it is given through an IV and must be given at an infusion center. Please note that not all people who are eligible will receive the medication since it is in limited supply.  Is the patient symptomatic and onset of symptoms within the last 7 days?  Yes  Is the patient interested in a visit with a provider to discuss treatment options?: Yes  Is the patient seen at [facility]?  No: Warm transfer caller to [phone] to be scheduled with a virtual urgent provider.  During transfer, instruct  on appropriate time frame for visit     Review information with Patient    Your result was  positive. This means you have COVID-19 (coronavirus).      How can I protect others?    These guidelines are for isolating before returning to work, school or .       If you DO have symptoms:  o Stay home and away from others  - For at least 5 days after your symptoms started, AND   - You are fever free for 24 hours (with no medicine that reduces fever), AND  - Your other symptoms are better.  o Wear a mask for 10 full days any time you are around others.    If you DON'T have symptoms:  o Stay at home and away from others for at least 5 days after your positive test.  o Wear a mask for 10 full days any time you are around others.    There may be different guidelines for healthcare facilities. Please check with the specific sites before arriving.     If you've been told by a doctor that you were severely ill with COVID-19 or are immunocompromised, you should isolate for at least 10 days.    You should not go back to work until you meet the guidelines above for ending your home isolation. You don't need to be retested for COVID-19 before going back to work--studies show that you won't spread the virus if it's been at least 10 days since your symptoms started (or 20 days, if you have a weak immune system).    Employers, schools, and daycares: This is an official notice for this person's medical guidelines for returning in-person. They must meet the above guidelines before going back to work, school, or  in person.    You will receive a positive COVID-19 letter via [employer] or the mail soon with additional self-care information.      Would you like me to review some of that information with you now?  Yes    How can I take care of myself?      Get lots of rest. Drink extra fluids (unless a doctor has told you not to).      Take Tylenol (acetaminophen) for fever or pain. If you have liver or kidney problems, ask your family doctor if it's okay to take Tylenol.     Take either:     650 mg (two 325 mg pills)  every 4 to 6 hours, or     1,000 mg (two 500 mg pills) every 8 hours as needed.     Note: Do not take more than 3,000 mg in one day. Acetaminophen is found in many medicines (both prescribed and over-the-counter medicines). Read all labels to be sure you don't take too much.    For children, check the Tylenol bottle for the right dose (based on their age or weight).      If you have other health problems (like cancer, heart failure, an organ transplant or severe kidney disease): Call your specialty clinic if you don't feel better in the next 2 days.      Know when to call 911: Emergency warning signs include:    Trouble breathing or shortness of breath    Pain or pressure in the chest that doesn't go away    Feeling confused like you haven't felt before, or not being able to wake up    Bluish-colored lips or face        If you were tested for an upcoming procedure, please contact your provider for next steps.     Alisha Tompkins RN    Reason for Disposition    HIGH RISK for severe COVID complications (e.g., weak immune system, age > 64 years, obesity with BMI > 25, pregnant, chronic lung disease or other chronic medical condition) (Exception: Already seen by PCP and no new or worsening symptoms.)    Additional Information    Negative: SEVERE difficulty breathing (e.g., struggling for each breath, speaks in single words)    Negative: Difficult to awaken or acting confused (e.g., disoriented, slurred speech)    Negative: Bluish (or gray) lips or face now    Negative: Shock suspected (e.g., cold/pale/clammy skin, too weak to stand, low BP, rapid pulse)    Negative: Sounds like a life-threatening emergency to the triager    Negative: [1] Diagnosed or suspected COVID-19 AND [2] symptoms lasting 3 or more weeks    Negative: [1] COVID-19 exposure AND [2] no symptoms    Negative: COVID-19 vaccine reaction suspected (e.g., fever, headache, muscle aches) occurring 1 to 3 days after getting vaccine    Negative: COVID-19  vaccine, questions about    Negative: [1] Lives with someone known to have influenza (flu test positive) AND [2] flu-like symptoms (e.g., cough, runny nose, sore throat, SOB; with or without fever)    Negative: [1] Adult with possible COVID-19 symptoms AND [2] triager concerned about severity of symptoms or other causes    Negative: COVID-19 and breastfeeding, questions about    Negative: SEVERE or constant chest pain or pressure  (Exception: Mild central chest pain, present only when coughing.)    Negative: MODERATE difficulty breathing (e.g., speaks in phrases, SOB even at rest, pulse 100-120)    Negative: Headache and stiff neck (can't touch chin to chest)    Negative: Oxygen level (e.g., pulse oximetry) 90 percent or lower    Negative: Chest pain or pressure    Negative: Patient sounds very sick or weak to the triager    Negative: MILD difficulty breathing (e.g., minimal/no SOB at rest, SOB with walking, pulse <100)    Negative: Fever > 103 F (39.4 C)    Negative: [1] Fever > 101 F (38.3 C) AND [2] over 60 years of age    Negative: [1] Fever > 100.0 F (37.8 C) AND [2] bedridden (e.g., nursing home patient, CVA, chronic illness, recovering from surgery)    Protocols used: CORONAVIRUS (COVID-19) DIAGNOSED OR [id number] 1.18.2022

## 2022-10-17 NOTE — PATIENT INSTRUCTIONS
COVID-19 Outpatient Treatments  Your care team can help you find the best treatments for COVID-19. Talk to a health care provider or refer to the FDA medicine fact sheets below.    Important: You CAN'T have molnupiravir or Paxlovid if you are starting the medicine more than 5 days after your symptoms have started.  Paxlovid: https://www.fda.gov/media/144807/download  Molnupiravir: https://www.fda.gov/media/143796/download  Monoclonal antibodies: https://combatcovid.hhs.gov/what-are-monoclonal-antibodies  Paxlovid (nimatrelvir and ritonavir)  How it works  Two medicines (nirmatrelvir and ritonavir) are taken together. They stop the virus from growing. Less amount of virus is easier for your body to fight.  Benefits  Lowers risk of a hospital stay or death from COVID-19.  How to take    Medicine comes in a daily container with both medicine tablets. Take by mouth twice daily (once in the morning, once at night) for 5 days.    The number of tablets to take varies by patient.    Don't chew or break capsules. Swallow whole.  When to take  Take as soon as possible after positive COVID-19 test result, and within 5 days of your first symptoms.  Who can take it  Patients must be 12 years or older, weigh at least 88 pounds, and have tested positive for COVID-19. This is the preferred treatment for pregnant patients.  Possible side effects  Can cause altered sense of taste, diarrhea (loose, watery stools), high blood pressure, muscle aches.  Medicine conflicts    Some medicines may conflict with Paxlovid and may cause serious side effects.    Tell your care team about all the medicines you take, including prescription and over-the-counter medicines, vitamins and herbal supplements.    Your provider will review your medicines to make sure that you can safely take Paxlovid.  Cautions    Paxlovid is not advised for patients with severe kidney or liver disease. If you have kidney or liver problems, the dose may need to be  changed.    If you are pregnant or breastfeeding, talk to your care team about your options.    If you are sexually active, use trusted birth control while taking Paxlovid.  Molnupiravir  How it works  Stops the virus from growing. Less amount of virus is easier for your body to fight.  Benefits  Lowers risk of a hospital stay or death from COVID-19.  How to take  Take 4 capsules by mouth every 12 hours (4 in the morning and 4 at night) for 5 days. Don't chew or break capsules. Swallow whole.  When to take  Take as soon as possible after positive COVID-19 test result, and within 5 days of your first symptoms.  Who can take it  Patients must be 18 years or older and have tested positive for COVID-19.  Possible side effects  Diarrhea (loose, watery stools), nausea (feeling sick to your stomach), dizziness, headaches.  Medicine conflicts  Right now, there are no known conflicts with other drugs. But tell your care team all medicines you take.  Cautions    This is not advised for patients who are pregnant.    Patients who could become pregnant should use trusted birth control until 4 days after their last dose.    Sexually active people of any gender should use trusted birth control for 3 months after their last dose.  Monoclonal antibodies  How it works  Monoclonal antibodies can detect pieces of the COVID virus and stop it from infecting your cells.  Benefits  Lowers risk of a hospital stay or death from COVID-19. Monoclonal antibodies are known to work well against the omicron variant.  How it is given to you  You will receive the treatment either by an infusion through your vein (IV) or shots.  When to take  Get as soon as possible after you test positive for COVID-19, and within 7 days of your first symptoms.  Who can take it  Patients must be 12 years or older, weigh at least 88 pounds and have tested positive for COVID-19.  Possible side effects  Fever, chills, diarrhea (loose, watery stools), dizziness,  itchiness and rash.  More serious side effects include: fever, difficulty breathing, low oxygen level in your blood, chills, tiredness, fast or slow heart rate, chest discomfort or pain, weakness, confusion, nausea, headache, shortness of breath, low or high blood pressure, wheezing, swelling of your lips, face, or throat, rash including hives, itching, muscle aches, dizziness, feeling faint and sweating.  If you receive an IV, you may have brief pain, bleeding and bruising of the skin, soreness, swelling and possible infection at the place where you get the IV needle.  Medicine conflicts  Please tell you care team other medicines you take so they can assess if there are any conflicts.  Cautions  Your doctor will talk with you about risks and benefits of this treatment and will help choose the best option for you.  For informational purposes only. Not to replace the advice of your health care provider.  Copyright   2022 [facility]. All rights reserved. Clinically reviewed by Lay Garrido. [employer] [id number] - 08/22.

## 2022-10-17 NOTE — PROGRESS NOTES
"Shahnaz is a 62 year old who is being evaluated via a billable telephone visit.          Assessment & Plan     Clinical diagnosis of COVID-19    Treat with Paxlovid.    - nirmatrelvir and ritonavir (PAXLOVID) therapy pack; Take 3 tablets by mouth 2 times daily for 5 days (Take 2 Nirmatrelvir tablets and 1 Ritonavir tablet twice daily for 5 days)               COVID-19 positive patient.  Encounter for consideration of medication intervention. Patient does qualify for a prescription. Full discussion with patient including medication options, risks and benefits. Potential drug interactions reviewed with patient.     Treatment Planned Paxlovid    Temporary change to home medications:  None     Estimated body mass index is 38.76 kg/m  as calculated from the following:    Height as of 10/25/21: 1.665 m (5' 5.55\").    Weight as of 10/25/21: 107.5 kg (236 lb 14.4 oz).  GFR Estimate   Date Value Ref Range Status   10/25/2021 83 >60 mL/min/1.73m2 Final     Comment:     As of July 11, 2021, eGFR is calculated by the CKD-EPI creatinine equation, without race adjustment. eGFR can be influenced by muscle mass, exercise, and diet. The reported eGFR is an estimation only and is only applicable if the renal function is stable.   10/05/2020 >60 >60 mL/min/1.73m2 Final     No results found for: [id number]    No follow-ups on file.    No name on file  [facility]    Subjective   Shahnaz is a 62 year old, presenting for the following health issues:  No chief complaint on file.      HPI       COVID-19 Symptom Review  How many days ago did these symptoms start? 10/15    Are any of the following symptoms significant for you?    New or worsening difficulty breathing? No    Worsening cough? Yes, it's a dry cough.     Fever or chills? Yes, the highest temperature was 99.9    Headache: No    Sore throat: YES    Chest pain: No    Diarrhea: No    Body aches? YES    What treatments has patient tried? Acetaminophen and " Nonsteroidals   Does patient live in a nursing home, group home, or shelter? No  Does patient have a way to get food/medications during quarantined? Yes, I have a friend or family member who can help me.            Review of Systems   Constitutional, HEENT, cardiovascular, pulmonary, gi and gu systems are negative, except as otherwise noted.      Objective           Vitals:  No vitals were obtained today due to virtual visit.    Physical Exam   healthy, alert and no distress  PSYCH: Alert and oriented times 3; coherent speech, normal   rate and volume, able to articulate logical thoughts, able   to abstract reason, no tangential thoughts, no hallucinations   or delusions  Her affect is normal and pleasant  RESP: No cough, no audible wheezing, able to talk in full sentences  Remainder of exam unable to be completed due to telephone visits                Phone call duration: 5 minutes

## 2022-11-21 DIAGNOSIS — I10 BENIGN ESSENTIAL HYPERTENSION: ICD-10-CM

## 2022-11-21 RX ORDER — LISINOPRIL 10 MG/1
TABLET ORAL
Qty: 90 TABLET | Refills: 0 | Status: SHIPPED | OUTPATIENT
Start: 2022-11-21 | End: 2023-01-11

## 2022-11-21 NOTE — TELEPHONE ENCOUNTER
"Routing refill request to provider for review/approval because:  Labs not current:  Cr k bp    Last Written Prescription Date:  10/25/21  Last Fill Quantity: 90,  # refills: 03   Last office visit provider:  10/25/21     Requested Prescriptions   Pending Prescriptions Disp Refills     lisinopril (ZESTRIL) 10 MG tablet [Pharmacy Med Name: Lisinopril Oral Tablet 10 MG] 90 tablet 0     Sig: Take 1 tablet (10 mg total) by mouth daily.       ACE Inhibitors (Including Combos) Protocol Failed - 11/21/2022  1:59 AM        Failed - Blood pressure under 140/90 in past 12 months     BP Readings from Last 3 Encounters:   10/25/21 126/80   11/26/19 122/80                 Failed - Normal serum creatinine on file in past 12 months     Recent Labs   Lab Test 10/25/21  0748   CR 0.78       Ok to refill medication if creatinine is low          Failed - Normal serum potassium on file in past 12 months     Recent Labs   Lab Test 10/25/21  0748   POTASSIUM 3.9             Passed - Recent (12 mo) or future (30 days) visit within the authorizing provider's specialty     Patient has had an office visit with the authorizing provider or a provider within the authorizing providers department within the previous 12 mos or has a future within next 30 days. See \"Patient Info\" tab in inbasket, or \"Choose Columns\" in Meds & Orders section of the refill encounter.              Passed - Medication is active on med list        Passed - Patient is age 18 or older        Passed - No active pregnancy on record        Passed - No positive pregnancy test within past 12 months             Sherie Mackenzie RN 11/21/22 12:04 PM  "

## 2022-12-05 DIAGNOSIS — I10 BENIGN ESSENTIAL HYPERTENSION: ICD-10-CM

## 2022-12-05 RX ORDER — LISINOPRIL 10 MG/1
10 TABLET ORAL DAILY
Qty: 90 TABLET | Refills: 0 | Status: CANCELLED | OUTPATIENT
Start: 2022-12-05

## 2022-12-05 NOTE — TELEPHONE ENCOUNTER
Refill was sent two weeks ago so she should not be out of meds. Please advise her to call pharmacy

## 2022-12-05 NOTE — TELEPHONE ENCOUNTER
Reason for Call:  Medication or medication refill: Medication refill    Do you use a [facility]?  Name of the pharmacy and phone number for the current request:  [facility] [place]     Name of the medication requested: lisinopril (ZESTRIL) 10 MG tablet    Other request: Patient is out of this medication. Patient has appt scheduled for 1/11/2023. Patient is requesting a refill.    Can we leave a detailed message on this number? YES    Phone number patient can be reached at: Cell number on file:    Telephone Information:   Mobile [phone]       Best Time: Any    Call taken on 12/5/2022 at 8:40 AM by Rebeca Casrto

## 2022-12-05 NOTE — TELEPHONE ENCOUNTER
Pt has been notified of message below and was advise to call her pharmacy . No further question ask

## 2023-01-11 ENCOUNTER — OFFICE VISIT (OUTPATIENT)
Dept: FAMILY MEDICINE | Facility: CLINIC | Age: 63
End: 2023-01-11
Payer: COMMERCIAL

## 2023-01-11 VITALS
DIASTOLIC BLOOD PRESSURE: 86 MMHG | HEART RATE: 74 BPM | OXYGEN SATURATION: 98 % | RESPIRATION RATE: 20 BRPM | BODY MASS INDEX: 39.04 KG/M2 | SYSTOLIC BLOOD PRESSURE: 132 MMHG | HEIGHT: 65 IN | TEMPERATURE: 98.4 F | WEIGHT: 234.3 LBS

## 2023-01-11 DIAGNOSIS — Z00.00 ENCOUNTER FOR PREVENTATIVE ADULT HEALTH CARE EXAMINATION: Primary | ICD-10-CM

## 2023-01-11 DIAGNOSIS — N95.9 UNSPECIFIED MENOPAUSAL AND PERIMENOPAUSAL DISORDER: ICD-10-CM

## 2023-01-11 DIAGNOSIS — I10 BENIGN ESSENTIAL HYPERTENSION: ICD-10-CM

## 2023-01-11 LAB
ALBUMIN SERPL BCG-MCNC: 4.1 G/DL (ref 3.5–5.2)
ALP SERPL-CCNC: 103 U/L (ref 35–104)
ALT SERPL W P-5'-P-CCNC: 21 U/L (ref 10–35)
ANION GAP SERPL CALCULATED.3IONS-SCNC: 13 MMOL/L (ref 7–15)
AST SERPL W P-5'-P-CCNC: 34 U/L (ref 10–35)
BILIRUB SERPL-MCNC: 0.6 MG/DL
BUN SERPL-MCNC: 15.9 MG/DL (ref 8–23)
CALCIUM SERPL-MCNC: 9.6 MG/DL (ref 8.8–10.2)
CHLORIDE SERPL-SCNC: 103 MMOL/L (ref 98–107)
CHOLEST SERPL-MCNC: 207 MG/DL
CREAT SERPL-MCNC: 0.8 MG/DL (ref 0.51–0.95)
DEPRECATED HCO3 PLAS-SCNC: 24 MMOL/L (ref 22–29)
GFR SERPL CREATININE-BSD FRML MDRD: 84 ML/MIN/1.73M2
GLUCOSE SERPL-MCNC: 93 MG/DL (ref 70–99)
HDLC SERPL-MCNC: 68 MG/DL
HGB BLD-MCNC: 13.2 G/DL (ref 11.7–15.7)
LDLC SERPL CALC-MCNC: 124 MG/DL
NONHDLC SERPL-MCNC: 139 MG/DL
POTASSIUM SERPL-SCNC: 4.4 MMOL/L (ref 3.4–5.3)
PROT SERPL-MCNC: 7.2 G/DL (ref 6.4–8.3)
SODIUM SERPL-SCNC: 140 MMOL/L (ref 136–145)
TRIGL SERPL-MCNC: 74 MG/DL

## 2023-01-11 PROCEDURE — 90682 RIV4 VACC RECOMBINANT DNA IM: CPT | Performed by: FAMILY MEDICINE

## 2023-01-11 PROCEDURE — 99213 OFFICE O/P EST LOW 20 MIN: CPT | Mod: 25 | Performed by: FAMILY MEDICINE

## 2023-01-11 PROCEDURE — 36415 COLL VENOUS BLD VENIPUNCTURE: CPT | Performed by: FAMILY MEDICINE

## 2023-01-11 PROCEDURE — 80053 COMPREHEN METABOLIC PANEL: CPT | Performed by: FAMILY MEDICINE

## 2023-01-11 PROCEDURE — 85018 HEMOGLOBIN: CPT | Performed by: FAMILY MEDICINE

## 2023-01-11 PROCEDURE — 80061 LIPID PANEL: CPT | Performed by: FAMILY MEDICINE

## 2023-01-11 PROCEDURE — 99396 PREV VISIT EST AGE 40-64: CPT | Mod: 25 | Performed by: FAMILY MEDICINE

## 2023-01-11 PROCEDURE — 90471 IMMUNIZATION ADMIN: CPT | Performed by: FAMILY MEDICINE

## 2023-01-11 RX ORDER — LISINOPRIL 10 MG/1
10 TABLET ORAL DAILY
Qty: 90 TABLET | Refills: 3 | Status: SHIPPED | OUTPATIENT
Start: 2023-01-11 | End: 2024-01-15

## 2023-01-11 RX ORDER — OMEGA-3 FATTY ACIDS/FISH OIL 300-500 MG
CAPSULE ORAL
COMMUNITY

## 2023-01-11 ASSESSMENT — ENCOUNTER SYMPTOMS: ARTHRALGIAS: 1

## 2023-01-11 ASSESSMENT — PAIN SCALES - GENERAL: PAINLEVEL: NO PAIN (0)

## 2023-01-11 ASSESSMENT — PATIENT HEALTH QUESTIONNAIRE - PHQ9
SUM OF ALL RESPONSES TO PHQ QUESTIONS 1-9: 8
10. IF YOU CHECKED OFF ANY PROBLEMS, HOW DIFFICULT HAVE THESE PROBLEMS MADE IT FOR YOU TO DO YOUR WORK, TAKE CARE OF THINGS AT HOME, OR GET ALONG WITH OTHER PEOPLE: NOT DIFFICULT AT ALL
SUM OF ALL RESPONSES TO PHQ QUESTIONS 1-9: 8

## 2023-01-11 NOTE — PROGRESS NOTES
Answers for HPI/ROS submitted by the patient on 1/11/2023  If you checked off any problems, how difficult have these problems made it for you to do your work, take care of things at home, or get along with other people?: Not difficult at all  PHQ9 TOTAL SCORE: 8       SUBJECTIVE:   CC: Shahnaz is an 62 year old who presents for preventive health visit.     She is here today for physical.  No other concerns or questions.  Reviewed her health history including medications, allergies, family and social history and updated the chart.  She reports that her mother recently passed away related to Alzheimer's dementia.  She is feeling like she is doing well with management of her grief.  She had COVID in October.  No lingering symptoms. She is on lisinopril for hypertension.  Tolerating this well.  Blood pressure is controlled.  No concerning side effects.  She has arthritis in her knees.  Manages this with activity modification and Tylenol/ibuprofen as needed. She wears compression stockings for bilateral lower extremity edema. She is working on lifestyle changes and weight loss.  She gets regular vision and dental exams.      Otherwise review of systems is assessed and is negative.  No other concerns or questions today.    Patient has been advised of split billing requirements and indicates understanding: Yes  Healthy Habits:     Getting at least 3 servings of Calcium per day:  Yes    Bi-annual eye exam:  Yes    Dental care twice a year:  Yes    Sleep apnea or symptoms of sleep apnea:  None    Diet:  Low salt    Frequency of exercise:  2-3 days/week    Duration of exercise:  15-30 minutes    Taking medications regularly:  Yes    Medication side effects:  Not applicable    PHQ-2 Total Score: 4    Additional concerns today:  No              Today's PHQ-2 Score:   PHQ-2 ( 1999 Pf[employer]) 1/11/2023   Q1: Little interest or pleasure in doing things 2   Q2: Feeling down, depressed or hopeless 2   PHQ-2 Score 4   PHQ-2 Total Score  (12-17 Years)- Positive if 3 or more points; Administer PHQ-A if positive -   Q1: Little interest or pleasure in doing things More than half the days   Q2: Feeling down, depressed or hopeless More than half the days   PHQ-2 Score 4         Social History     Tobacco Use     Smoking status: Never     Smokeless tobacco: Never   Substance Use Topics     Alcohol use: No         Alcohol Use 1/11/2023   Prescreen: >3 drinks/day or >7 drinks/week? Not Applicable       Reviewed orders with patient.  Reviewed health maintenance and updated orders accordingly - Yes  Current Outpatient Medications   Medication Sig Dispense Refill     ascorbic acid, vitamin C, (ASCORBIC ACID WITH KEYA HIPS) 500 MG tablet [ASCORBIC ACID, VITAMIN C, (ASCORBIC ACID WITH KEYA HIPS) 500 MG TABLET] Take 500 mg by mouth daily.       calcium carbonate-vitamin D3 (CALCIUM 600 WITH VITAMIN D3) 600 mg(1,500mg) -200 unit per tablet [CALCIUM CARBONATE-VITAMIN D3 (CALCIUM 600 WITH VITAMIN D3) 600 MG(1,500MG) -200 UNIT PER TABLET] Take 1 tablet by mouth daily.       coenzyme Q10 (CO Q-10) 100 mg capsule [COENZYME Q10 (CO Q-10) 100 MG CAPSULE] Take 100 mg by mouth daily. Take as directed.       cranberry 400 mg cap [CRANBERRY 400 MG CAP] Take 1 tablet by mouth daily.       lisinopril (ZESTRIL) 10 MG tablet Take 1 tablet (10 mg) by mouth daily 90 tablet 3     Omega-3 Fatty Acids (RA FISH OIL) 1000 MG CAPS          Breast Cancer Screening:    FHS-7:   Breast CA Risk Assessment (FHS-7) 1/5/2022 1/11/2023   Did any of your first-degree relatives have breast or ovarian cancer? No Yes   Did any of your relatives have bilateral breast cancer? No Unknown   Did any man in your family have breast cancer? No No   Did any woman in your family have breast and ovarian cancer? No Yes   Did any woman in your family have breast cancer before age 50 y? No No   Do you have 2 or more relatives with breast and/or ovarian cancer? No Yes   Do you have 2 or more relatives with breast  "and/or bowel cancer? - Yes         Pertinent mammograms are reviewed under the imaging tab.    History of abnormal Pap smear: Status post benign hysterectomy. Health Maintenance and Surgical History updated.     Reviewed and updated as needed this visit by clinical staff   Tobacco  Allergies  Meds              Reviewed and updated as needed this visit by Provider    Allergies  Meds                 Review of Systems   Cardiovascular: Positive for peripheral edema.   Breasts:  Negative for tenderness and discharge.   Genitourinary: Negative for pelvic pain, vaginal bleeding and vaginal discharge.   Musculoskeletal: Positive for arthralgias.          OBJECTIVE:   /86 (BP Location: Left arm, Patient Position: Sitting, Cuff Size: Adult Regular)   Pulse 74   Temp 98.4  F (36.9  C) (Tympanic)   Resp 20   Ht 1.645 m (5' 4.76\")   Wt 106.3 kg (234 lb 4.8 oz)   SpO2 98%   BMI 39.27 kg/m    Physical Exam  GENERAL APPEARANCE: healthy, alert and no distress  EYES: Eyes grossly normal to inspection, PERRL and conjunctivae and sclerae normal  HENT: ear canals and TM's normal  RESP: lungs clear to auscultation - no rales, rhonchi or wheezes  BREAST: normal without masses, tenderness or nipple discharge and no palpable axillary masses or adenopathy  CV: regular rate and rhythm, normal S1 S2, no S3 or S4, no murmur, click or rub, no peripheral edema and peripheral pulses strong  ABDOMEN: soft, nontender, no hepatosplenomegaly, no masses and bowel sounds normal  MS: no musculoskeletal defects are noted and gait is age appropriate without ataxia  SKIN: no suspicious lesions or rashes  NEURO: Normal strength and tone, sensory exam grossly normal, mentation intact and speech normal  PSYCH: mentation appears normal and affect normal/bright        ASSESSMENT/PLAN:   Shahnaz was seen today for physical.    Diagnoses and all orders for this visit:    Encounter for preventative adult health care examination  -     Lipid panel " reflex to direct LDL Fasting; Future  -     Hemoglobin; Future  -     Lipid panel reflex to direct LDL Fasting  -     Hemoglobin  -Influenza vaccine administered.  Discussed the shingles vaccine and encouraged her to check with insurance on coverage.  She prefers to get her COVID booster another day.  Continue with regular vision and dental exams.  No Paps needed as she had hysterectomy for benign reasons.  She has mammogram scheduled.  Colonoscopy will be due in 2024.  Encouraged regular exercise, healthy diet and weight loss efforts.  Follow-up in 1 year for annual physical    Benign essential hypertension  -     lisinopril (ZESTRIL) 10 MG tablet; Take 1 tablet (10 mg) by mouth daily  -     Comprehensive metabolic panel (BMP + Alb, Alk Phos, ALT, AST, Total. Bili, TP); Future  -     Lipid panel reflex to direct LDL Fasting; Future  -     Comprehensive metabolic panel (BMP + Alb, Alk Phos, ALT, AST, Total. Bili, TP)  -     Lipid panel reflex to direct LDL Fasting  Controlled with current medication.  Goal blood pressure is less than 140/90.  Continue weight loss efforts, dietary changes and regular physical activity    Unspecified menopausal and perimenopausal disorder  -     DX Hip/Pelvis/Spine; Future    Other orders  -     REVIEW OF HEALTH MAINTENANCE PROTOCOL ORDERS  -     INFLUENZA VACCINE 50-64 OR 18-64 W/EGG ALLERGY (FLUBLOK)              COUNSELING:  Reviewed preventive health counseling, as reflected in patient instructions        She reports that she has never smoked. She has never used smokeless tobacco.          Cyn Garrido MD  [facility]

## 2023-01-16 ENCOUNTER — ANCILLARY PROCEDURE (OUTPATIENT)
Dept: MAMMOGRAPHY | Facility: CLINIC | Age: 63
End: 2023-01-16
Attending: FAMILY MEDICINE
Payer: COMMERCIAL

## 2023-01-16 DIAGNOSIS — Z12.31 VISIT FOR SCREENING MAMMOGRAM: ICD-10-CM

## 2023-01-16 PROCEDURE — 77067 SCR MAMMO BI INCL CAD: CPT

## 2023-07-24 ENCOUNTER — TRANSFERRED RECORDS (OUTPATIENT)
Dept: HEALTH INFORMATION MANAGEMENT | Facility: CLINIC | Age: 63
End: 2023-07-24
Payer: COMMERCIAL

## 2023-10-09 ENCOUNTER — TRANSFERRED RECORDS (OUTPATIENT)
Dept: HEALTH INFORMATION MANAGEMENT | Facility: CLINIC | Age: 63
End: 2023-10-09
Payer: COMMERCIAL

## 2024-01-04 ENCOUNTER — PATIENT OUTREACH (OUTPATIENT)
Dept: CARE COORDINATION | Facility: CLINIC | Age: 64
End: 2024-01-04
Payer: COMMERCIAL

## 2024-01-15 ENCOUNTER — TELEPHONE (OUTPATIENT)
Dept: FAMILY MEDICINE | Facility: CLINIC | Age: 64
End: 2024-01-15
Payer: COMMERCIAL

## 2024-01-15 DIAGNOSIS — I10 BENIGN ESSENTIAL HYPERTENSION: ICD-10-CM

## 2024-01-15 NOTE — TELEPHONE ENCOUNTER
Medication Question or Refill    Contacts         Type Contact Phone/Fax    01/15/2024 01:33 PM CST Phone (Incoming) Shahnaz Arias (Self) [phone] (M)            What medication are you calling about (include dose and sig)?: Zestril 10 mg    Preferred Pharmacy:   [id number] [address] [address]  Phone: [phone] Fax: [phone]      Controlled Substance Agreement on file:   CSA -- Patient Level:    CSA: None found at the patient level.       Who prescribed the medication?: PCP    Do you need a refill? Yes    When did you use the medication last? 1/15/2024    Patient offered an appointment? Yes: Appt not soon enough for medication to last    Do you have any questions or concerns?  No      [facility] or would you prefer to receive a phone call?:   Patient would prefer a phone call   Okay to leave a detailed message?: Yes at Cell number on file:    Telephone Information:   Mobile [phone]

## 2024-01-16 RX ORDER — LISINOPRIL 10 MG/1
10 TABLET ORAL DAILY
Qty: 90 TABLET | Refills: 3 | Status: SHIPPED | OUTPATIENT
Start: 2024-01-16

## 2024-01-18 ENCOUNTER — PATIENT OUTREACH (OUTPATIENT)
Dept: CARE COORDINATION | Facility: CLINIC | Age: 64
End: 2024-01-18
Payer: COMMERCIAL

## 2024-01-31 ENCOUNTER — ANCILLARY PROCEDURE (OUTPATIENT)
Dept: MAMMOGRAPHY | Facility: CLINIC | Age: 64
End: 2024-01-31
Attending: FAMILY MEDICINE
Payer: COMMERCIAL

## 2024-01-31 DIAGNOSIS — Z12.31 VISIT FOR SCREENING MAMMOGRAM: ICD-10-CM

## 2024-01-31 PROCEDURE — 77067 SCR MAMMO BI INCL CAD: CPT

## 2024-03-03 ENCOUNTER — HEALTH MAINTENANCE LETTER (OUTPATIENT)
Age: 64
End: 2024-03-03

## 2024-03-07 SDOH — HEALTH STABILITY: PHYSICAL HEALTH: ON AVERAGE, HOW MANY MINUTES DO YOU ENGAGE IN EXERCISE AT THIS LEVEL?: 40 MIN

## 2024-03-07 SDOH — HEALTH STABILITY: PHYSICAL HEALTH: ON AVERAGE, HOW MANY DAYS PER WEEK DO YOU ENGAGE IN MODERATE TO STRENUOUS EXERCISE (LIKE A BRISK WALK)?: 3 DAYS

## 2024-03-07 ASSESSMENT — SOCIAL DETERMINANTS OF HEALTH (SDOH): HOW OFTEN DO YOU GET TOGETHER WITH FRIENDS OR RELATIVES?: THREE TIMES A WEEK

## 2024-03-14 ENCOUNTER — OFFICE VISIT (OUTPATIENT)
Dept: FAMILY MEDICINE | Facility: CLINIC | Age: 64
End: 2024-03-14
Payer: COMMERCIAL

## 2024-03-14 VITALS
OXYGEN SATURATION: 98 % | RESPIRATION RATE: 14 BRPM | WEIGHT: 235.9 LBS | HEART RATE: 75 BPM | TEMPERATURE: 99 F | SYSTOLIC BLOOD PRESSURE: 132 MMHG | HEIGHT: 65 IN | DIASTOLIC BLOOD PRESSURE: 80 MMHG | BODY MASS INDEX: 39.3 KG/M2

## 2024-03-14 DIAGNOSIS — Z79.899 MEDICATION MANAGEMENT: ICD-10-CM

## 2024-03-14 DIAGNOSIS — E66.812 CLASS 2 SEVERE OBESITY WITH SERIOUS COMORBIDITY AND BODY MASS INDEX (BMI) OF 38.0 TO 38.9 IN ADULT, UNSPECIFIED OBESITY TYPE (H): ICD-10-CM

## 2024-03-14 DIAGNOSIS — Z13.220 LIPID SCREENING: ICD-10-CM

## 2024-03-14 DIAGNOSIS — Z13.1 DIABETES MELLITUS SCREENING: ICD-10-CM

## 2024-03-14 DIAGNOSIS — Z78.0 POSTMENOPAUSAL STATUS: ICD-10-CM

## 2024-03-14 DIAGNOSIS — E66.01 CLASS 2 SEVERE OBESITY WITH SERIOUS COMORBIDITY AND BODY MASS INDEX (BMI) OF 38.0 TO 38.9 IN ADULT, UNSPECIFIED OBESITY TYPE (H): ICD-10-CM

## 2024-03-14 DIAGNOSIS — Z12.11 COLON CANCER SCREENING: ICD-10-CM

## 2024-03-14 DIAGNOSIS — Z00.00 ENCOUNTER FOR ROUTINE HISTORY AND PHYSICAL EXAM IN FEMALE: Primary | ICD-10-CM

## 2024-03-14 DIAGNOSIS — I10 BENIGN ESSENTIAL HYPERTENSION: ICD-10-CM

## 2024-03-14 LAB
ALBUMIN UR-MCNC: NEGATIVE MG/DL
APPEARANCE UR: CLEAR
BILIRUB UR QL STRIP: NEGATIVE
COLOR UR AUTO: YELLOW
ERYTHROCYTE [DISTWIDTH] IN BLOOD BY AUTOMATED COUNT: 12.9 % (ref 10–15)
GLUCOSE UR STRIP-MCNC: NEGATIVE MG/DL
HBA1C MFR BLD: 5.6 % (ref 0–5.6)
HCT VFR BLD AUTO: 39.7 % (ref 35–47)
HGB BLD-MCNC: 13.1 G/DL (ref 11.7–15.7)
HGB UR QL STRIP: NEGATIVE
KETONES UR STRIP-MCNC: 15 MG/DL
LEUKOCYTE ESTERASE UR QL STRIP: NEGATIVE
MCH RBC QN AUTO: 29.9 PG (ref 26.5–33)
MCHC RBC AUTO-ENTMCNC: 33 G/DL (ref 31.5–36.5)
MCV RBC AUTO: 91 FL (ref 78–100)
NITRATE UR QL: NEGATIVE
PH UR STRIP: 5.5 [PH] (ref 5–8)
PLATELET # BLD AUTO: 235 10E3/UL (ref 150–450)
RBC # BLD AUTO: 4.38 10E6/UL (ref 3.8–5.2)
SP GR UR STRIP: 1.01 (ref 1–1.03)
UROBILINOGEN UR STRIP-ACNC: 0.2 E.U./DL
WBC # BLD AUTO: 7 10E3/UL (ref 4–11)

## 2024-03-14 PROCEDURE — 99396 PREV VISIT EST AGE 40-64: CPT | Performed by: NURSE PRACTITIONER

## 2024-03-14 PROCEDURE — 85027 COMPLETE CBC AUTOMATED: CPT | Performed by: NURSE PRACTITIONER

## 2024-03-14 PROCEDURE — 80053 COMPREHEN METABOLIC PANEL: CPT | Performed by: NURSE PRACTITIONER

## 2024-03-14 PROCEDURE — 84443 ASSAY THYROID STIM HORMONE: CPT | Performed by: NURSE PRACTITIONER

## 2024-03-14 PROCEDURE — 36415 COLL VENOUS BLD VENIPUNCTURE: CPT | Performed by: NURSE PRACTITIONER

## 2024-03-14 PROCEDURE — 83036 HEMOGLOBIN GLYCOSYLATED A1C: CPT | Performed by: NURSE PRACTITIONER

## 2024-03-14 PROCEDURE — 80061 LIPID PANEL: CPT | Performed by: NURSE PRACTITIONER

## 2024-03-14 PROCEDURE — 81003 URINALYSIS AUTO W/O SCOPE: CPT | Performed by: NURSE PRACTITIONER

## 2024-03-14 ASSESSMENT — PAIN SCALES - GENERAL: PAINLEVEL: SEVERE PAIN (7)

## 2024-03-14 NOTE — PROGRESS NOTES
Assessment and Plan:    Encounter for routine history and physical exam in female  Recommend eating healthy and increasing activity level.  She declines RSV, influenza, COVID, shingles vaccines.  She is up-to-date on breast cancer screening.  She is due for a colonoscopy.  Bone density scan ordered.  - CBC with platelets  - Comprehensive metabolic panel  - TSH with free T4 reflex  - UA Macroscopic with reflex to Microscopic and Culture  - CBC with platelets  - Comprehensive metabolic panel  - TSH with free T4 reflex  - UA Macroscopic with reflex to Microscopic and Culture    Lipid screening  - Lipid panel reflex to direct LDL Fasting  - Lipid panel reflex to direct LDL Fasting    Diabetes mellitus screening  - Hemoglobin A1c  - Hemoglobin A1c    Postmenopausal status  Recommend adequate calcium and vitamin D intake.  - DX Bone Density    Colon cancer screening  - Colonoscopy Screening  Referral    Benign essential hypertension  This is controlled with lisinopril.    Class 2 severe obesity with serious comorbidity and body mass index (BMI) of 38.0 to 38.9 in adult, unspecified obesity type (H)  Recommend eating healthy and increasing activity level.       Subjective:     Shahnaz is a 63 year old female presenting to the clinic for a female physical.     LMP: hysterectomy at age 42 for menorrhagia   Hx of abnormal pap smear: none   Last pap smear: 2010   Perform self-breast exams: yes   Vaginal discharge or irritation: none   Sexually active:  since 1998   Contraception: none   Concerns for STDs: none   Previous pregnancies:two pregnancies (vaginal deliveries)  Kids are 34 and 38 (both girls)     Patient has hypertension which is controlled lisinopril 10 mg daily.  She denies headaches, blurry vision, chest pain, shortness of breath with exertion, edema, orthopnea, syncope. She wears compression stockings for bilateral lower extremity edema.     Review of systems:  I performed a 10 point review of  systems.  All pertinent positives and negatives are noted in the HPI. All others are negative.     Allergies   Allergen Reactions    Amoxicillin Rash     Fever    Sulfa Antibiotics Unknown and Rash       Current Outpatient Medications   Medication    ascorbic acid, vitamin C, (ASCORBIC ACID WITH KEYA HIPS) 500 MG tablet    calcium carbonate-vitamin D3 (CALCIUM 600 WITH VITAMIN D3) 600 mg(1,500mg) -200 unit per tablet    coenzyme Q10 (CO Q-10) 100 mg capsule    cranberry 400 mg cap    lisinopril (ZESTRIL) 10 MG tablet    Omega-3 Fatty Acids (RA FISH OIL) 1000 MG CAPS     No current facility-administered medications for this visit.       Social History     Socioeconomic History    Marital status:      Spouse name: Not on file    Number of children: Not on file    Years of education: Not on file    Highest education level: Not on file   Occupational History    Not on file   Tobacco Use    Smoking status: Never    Smokeless tobacco: Never   Substance and Sexual Activity    Alcohol use: No    Drug use: Never    Sexual activity: Yes     Partners: Male     Birth control/protection: None   Other Topics Concern    Not on file   Social History Narrative    Not on file     Social Determinants of Health     Financial Resource Strain: Low Risk  (3/7/2024)    Financial Resource Strain     Within the past 12 months, have you or your family members you live with been unable to get utilities (heat, electricity) when it was really needed?: No   Food Insecurity: Low Risk  (3/7/2024)    Food Insecurity     Within the past 12 months, did you worry that your food would run out before you got money to buy more?: No     Within the past 12 months, did the food you bought just not last and you didn t have money to get more?: No   Transportation Needs: Low Risk  (3/7/2024)    Transportation Needs     Within the past 12 months, has lack of transportation kept you from medical appointments, getting your medicines, non-medical meetings or  appointments, work, or from getting things that you need?: No   Physical Activity: Insufficiently Active (3/7/2024)    Exercise Vital Sign     Days of Exercise per Week: 3 days     Minutes of Exercise per Session: 40 min   Stress: No Stress Concern Present (3/7/2024)    [employer] - Occupational Stress Questionnaire     Feeling of Stress : Only a little   Social Connections: Unknown (3/7/2024)    Social Connection and Isolation Panel [NHANES]     Frequency of Communication with Friends and Family: Not on file     Frequency of Social Gatherings with Friends and Family: Three times a week     Attends [employer] Services: Not on file     Active Member of Clubs or Organizations: Not on file     Attends Club or Organization Meetings: Not on file     Marital Status: Not on file   Interpersonal Safety: Low Risk  (3/14/2024)    Interpersonal Safety     Do you feel physically and emotionally safe where you currently live?: Yes     Within the past 12 months, have you been hit, slapped, kicked or otherwise physically hurt by someone?: No     Within the past 12 months, have you been humiliated or emotionally abused in other ways by your partner or ex-partner?: No   Housing Stability: Low Risk  (3/7/2024)    Housing Stability     Do you have housing? : Yes     Are you worried about losing your housing?: No       Past Medical History:   Diagnosis Date    Arthropathy, multiple sites     Created by Conversion  Replacement Utility updated for latest IMO load       Family History   Problem Relation Age of Onset    Dementia Mother         Alzheimer    Varicose Veins Mother     Varicose Veins Sister     Varicose Veins Brother     Breast Cancer Maternal Aunt     Dementia Maternal Aunt         Alzheimers    Breast Cancer Maternal Aunt     Dementia Maternal Aunt         Alzheimers       Past Surgical History:   Procedure Laterality Date    HYSTERECTOMY  2002    VEIN LIGATION AND STRIPPING Bilateral     ZZC  "TOTAL ABDOM HYSTERECTOMY      Description: Total Abdominal Hysterectomy;  Recorded: 03/28/2013;       Objective:     /80 (BP Location: Left arm, Patient Position: Sitting, Cuff Size: Adult Large)   Pulse 75   Temp 99  F (37.2  C) (Temporal)   Resp 14   Ht 1.658 m (5' 5.28\")   Wt 107 kg (235 lb 14.4 oz)   SpO2 98%   BMI 38.92 kg/m      Patient is alert, no obvious distress.   Skin: Warm, dry.  No rashes or lesions. Skin turgor rapid return.   HEENT:  Eyes normal.  Ears normal.  Nose patent, mucosa pink.  Oropharynx mucosa pink, no lesions or tonsil enlargement.   Neck:  Supple, without lymphadenopathy, bruits, JVD. Thyroid normal texture and size.    Lungs:  Clear to auscultation.  No wheezing, rales noted.  Respirations even and unlabored.   Heart:  Regular rate and rhythm.  No murmurs.   Breasts:  Normal.  No surrounding adenopathy.   Abdomen: Soft, nontender.  No organomegaly.  Bowel sounds normoactive.  No guarding or masses noted.   :  deferred  Musculoskeletal:  Full ROM of extremities.  Muscle strength equal +5/5.   Neurological:  Cranial nerves 2-12 intact.              "

## 2024-03-15 LAB
ALBUMIN SERPL BCG-MCNC: 4.4 G/DL (ref 3.5–5.2)
ALP SERPL-CCNC: 90 U/L (ref 40–150)
ALT SERPL W P-5'-P-CCNC: 16 U/L (ref 0–50)
ANION GAP SERPL CALCULATED.3IONS-SCNC: 11 MMOL/L (ref 7–15)
AST SERPL W P-5'-P-CCNC: 25 U/L (ref 0–45)
BILIRUB SERPL-MCNC: 1 MG/DL
BUN SERPL-MCNC: 19.7 MG/DL (ref 8–23)
CALCIUM SERPL-MCNC: 9.8 MG/DL (ref 8.8–10.2)
CHLORIDE SERPL-SCNC: 101 MMOL/L (ref 98–107)
CHOLEST SERPL-MCNC: 184 MG/DL
CREAT SERPL-MCNC: 0.93 MG/DL (ref 0.51–0.95)
DEPRECATED HCO3 PLAS-SCNC: 25 MMOL/L (ref 22–29)
EGFRCR SERPLBLD CKD-EPI 2021: 70 ML/MIN/1.73M2
FASTING STATUS PATIENT QL REPORTED: YES
GLUCOSE SERPL-MCNC: 87 MG/DL (ref 70–99)
HDLC SERPL-MCNC: 65 MG/DL
LDLC SERPL CALC-MCNC: 108 MG/DL
NONHDLC SERPL-MCNC: 119 MG/DL
POTASSIUM SERPL-SCNC: 4.4 MMOL/L (ref 3.4–5.3)
PROT SERPL-MCNC: 7.4 G/DL (ref 6.4–8.3)
SODIUM SERPL-SCNC: 137 MMOL/L (ref 135–145)
TRIGL SERPL-MCNC: 55 MG/DL
TSH SERPL DL<=0.005 MIU/L-ACNC: 1.05 UIU/ML (ref 0.3–4.2)

## 2024-06-13 ENCOUNTER — HOSPITAL ENCOUNTER (OUTPATIENT)
Dept: BONE DENSITY | Facility: HOSPITAL | Age: 64
Discharge: HOME | End: 2024-06-13
Attending: NURSE PRACTITIONER | Admitting: NURSE PRACTITIONER
Payer: COMMERCIAL

## 2024-06-13 DIAGNOSIS — Z78.0 POSTMENOPAUSAL STATUS: ICD-10-CM

## 2024-06-13 PROCEDURE — 77089 TBS DXA CAL W/I&R FX RISK: CPT

## 2025-01-20 DIAGNOSIS — I10 BENIGN ESSENTIAL HYPERTENSION: ICD-10-CM

## 2025-01-20 RX ORDER — LISINOPRIL 10 MG/1
10 TABLET ORAL DAILY
Qty: 90 TABLET | Refills: 0 | Status: SHIPPED | OUTPATIENT
Start: 2025-01-20

## 2025-02-12 ENCOUNTER — PATIENT OUTREACH (OUTPATIENT)
Dept: CARE COORDINATION | Facility: CLINIC | Age: 65
End: 2025-02-12
Payer: COMMERCIAL

## 2025-02-26 ENCOUNTER — PATIENT OUTREACH (OUTPATIENT)
Dept: CARE COORDINATION | Facility: CLINIC | Age: 65
End: 2025-02-26
Payer: COMMERCIAL

## 2025-03-12 ENCOUNTER — HOSPITAL ENCOUNTER (EMERGENCY)
Facility: HOSPITAL | Age: 65
Discharge: HOME | End: 2025-03-13
Attending: FAMILY MEDICINE
Payer: COMMERCIAL

## 2025-03-12 ENCOUNTER — APPOINTMENT (OUTPATIENT)
Dept: RADIOLOGY | Facility: HOSPITAL | Age: 65
End: 2025-03-12
Attending: FAMILY MEDICINE
Payer: COMMERCIAL

## 2025-03-12 DIAGNOSIS — J15.9 COMMUNITY ACQUIRED BACTERIAL PNEUMONIA: ICD-10-CM

## 2025-03-12 PROCEDURE — 99284 EMERGENCY DEPT VISIT MOD MDM: CPT | Mod: 25

## 2025-03-12 PROCEDURE — 71046 X-RAY EXAM CHEST 2 VIEWS: CPT

## 2025-03-12 PROCEDURE — 87637 SARSCOV2&INF A&B&RSV AMP PRB: CPT | Performed by: FAMILY MEDICINE

## 2025-03-12 ASSESSMENT — COLUMBIA-SUICIDE SEVERITY RATING SCALE - C-SSRS
2. HAVE YOU ACTUALLY HAD ANY THOUGHTS OF KILLING YOURSELF IN THE PAST MONTH?: NO
1. IN THE PAST MONTH, HAVE YOU WISHED YOU WERE DEAD OR WISHED YOU COULD GO TO SLEEP AND NOT WAKE UP?: NO
6. HAVE YOU EVER DONE ANYTHING, STARTED TO DO ANYTHING, OR PREPARED TO DO ANYTHING TO END YOUR LIFE?: NO

## 2025-03-12 NOTE — Clinical Note
Shahnaz Arias was seen and treated in our emergency department on 3/12/2025.  She may return to work on 03/17/2025.       If you have any questions or concerns, please don't hesitate to call.      Soto Phipps MD

## 2025-03-13 VITALS
DIASTOLIC BLOOD PRESSURE: 68 MMHG | OXYGEN SATURATION: 94 % | BODY MASS INDEX: 36.96 KG/M2 | HEART RATE: 112 BPM | WEIGHT: 230 LBS | SYSTOLIC BLOOD PRESSURE: 131 MMHG | RESPIRATION RATE: 22 BRPM | HEIGHT: 66 IN | TEMPERATURE: 99.6 F

## 2025-03-13 LAB
ANION GAP SERPL CALCULATED.3IONS-SCNC: 6 MMOL/L (ref 7–15)
BASOPHILS # BLD AUTO: 0 10E3/UL (ref 0–0.2)
BASOPHILS NFR BLD AUTO: 0 %
BUN SERPL-MCNC: 17.6 MG/DL (ref 8–23)
CALCIUM SERPL-MCNC: 9.2 MG/DL (ref 8.8–10.4)
CHLORIDE SERPL-SCNC: 104 MMOL/L (ref 98–107)
CREAT SERPL-MCNC: 0.8 MG/DL (ref 0.51–0.95)
EGFRCR SERPLBLD CKD-EPI 2021: 83 ML/MIN/1.73M2
EOSINOPHIL # BLD AUTO: 0.1 10E3/UL (ref 0–0.7)
EOSINOPHIL NFR BLD AUTO: 1 %
ERYTHROCYTE [DISTWIDTH] IN BLOOD BY AUTOMATED COUNT: 13.2 % (ref 10–15)
FLUAV RNA SPEC QL NAA+PROBE: NEGATIVE
FLUBV RNA RESP QL NAA+PROBE: NEGATIVE
GLUCOSE SERPL-MCNC: 122 MG/DL (ref 70–99)
HCO3 SERPL-SCNC: 28 MMOL/L (ref 22–29)
HCT VFR BLD AUTO: 37 % (ref 35–47)
HGB BLD-MCNC: 12 G/DL (ref 11.7–15.7)
IMM GRANULOCYTES # BLD: 0 10E3/UL
IMM GRANULOCYTES NFR BLD: 0 %
LYMPHOCYTES # BLD AUTO: 0.8 10E3/UL (ref 0.8–5.3)
LYMPHOCYTES NFR BLD AUTO: 7 %
MAGNESIUM SERPL-MCNC: 1.8 MG/DL (ref 1.7–2.3)
MCH RBC QN AUTO: 29 PG (ref 26.5–33)
MCHC RBC AUTO-ENTMCNC: 32.4 G/DL (ref 31.5–36.5)
MCV RBC AUTO: 89 FL (ref 78–100)
MONOCYTES # BLD AUTO: 0.6 10E3/UL (ref 0–1.3)
MONOCYTES NFR BLD AUTO: 5 %
NEUTROPHILS # BLD AUTO: 9.3 10E3/UL (ref 1.6–8.3)
NEUTROPHILS NFR BLD AUTO: 86 %
NRBC # BLD AUTO: 0 10E3/UL
NRBC BLD AUTO-RTO: 0 /100
PLATELET # BLD AUTO: 229 10E3/UL (ref 150–450)
POTASSIUM SERPL-SCNC: 4.3 MMOL/L (ref 3.4–5.3)
RBC # BLD AUTO: 4.14 10E6/UL (ref 3.8–5.2)
RSV RNA SPEC NAA+PROBE: NEGATIVE
SARS-COV-2 RNA RESP QL NAA+PROBE: NEGATIVE
SODIUM SERPL-SCNC: 138 MMOL/L (ref 135–145)
WBC # BLD AUTO: 10.8 10E3/UL (ref 4–11)

## 2025-03-13 PROCEDURE — 96374 THER/PROPH/DIAG INJ IV PUSH: CPT

## 2025-03-13 PROCEDURE — 96361 HYDRATE IV INFUSION ADD-ON: CPT

## 2025-03-13 PROCEDURE — 85004 AUTOMATED DIFF WBC COUNT: CPT | Performed by: FAMILY MEDICINE

## 2025-03-13 PROCEDURE — 250N000011 HC RX IP 250 OP 636: Performed by: FAMILY MEDICINE

## 2025-03-13 PROCEDURE — 36415 COLL VENOUS BLD VENIPUNCTURE: CPT | Performed by: FAMILY MEDICINE

## 2025-03-13 PROCEDURE — 250N000009 HC RX 250: Performed by: FAMILY MEDICINE

## 2025-03-13 PROCEDURE — 83735 ASSAY OF MAGNESIUM: CPT | Performed by: FAMILY MEDICINE

## 2025-03-13 PROCEDURE — 85048 AUTOMATED LEUKOCYTE COUNT: CPT | Performed by: FAMILY MEDICINE

## 2025-03-13 PROCEDURE — 80048 BASIC METABOLIC PNL TOTAL CA: CPT | Performed by: FAMILY MEDICINE

## 2025-03-13 PROCEDURE — 250N000013 HC RX MED GY IP 250 OP 250 PS 637: Performed by: FAMILY MEDICINE

## 2025-03-13 PROCEDURE — 258N000003 HC RX IP 258 OP 636: Performed by: FAMILY MEDICINE

## 2025-03-13 PROCEDURE — 94640 AIRWAY INHALATION TREATMENT: CPT

## 2025-03-13 RX ORDER — ALBUTEROL SULFATE 90 UG/1
2 INHALANT RESPIRATORY (INHALATION) EVERY 4 HOURS PRN
Qty: 18 G | Refills: 0 | Status: SHIPPED | OUTPATIENT
Start: 2025-03-13

## 2025-03-13 RX ORDER — CEFTRIAXONE 2 G/1
2 INJECTION, POWDER, FOR SOLUTION INTRAMUSCULAR; INTRAVENOUS ONCE
Status: COMPLETED | OUTPATIENT
Start: 2025-03-13 | End: 2025-03-13

## 2025-03-13 RX ORDER — IPRATROPIUM BROMIDE AND ALBUTEROL SULFATE 2.5; .5 MG/3ML; MG/3ML
3 SOLUTION RESPIRATORY (INHALATION) ONCE
Status: COMPLETED | OUTPATIENT
Start: 2025-03-13 | End: 2025-03-13

## 2025-03-13 RX ORDER — CEFPODOXIME PROXETIL 200 MG/1
200 TABLET, FILM COATED ORAL 2 TIMES DAILY
Qty: 14 TABLET | Refills: 0 | Status: SHIPPED | OUTPATIENT
Start: 2025-03-13

## 2025-03-13 RX ORDER — AZITHROMYCIN 250 MG/1
500 TABLET, FILM COATED ORAL ONCE
Status: COMPLETED | OUTPATIENT
Start: 2025-03-13 | End: 2025-03-13

## 2025-03-13 RX ORDER — AZITHROMYCIN 250 MG/1
250 TABLET, FILM COATED ORAL DAILY
Qty: 4 TABLET | Refills: 0 | Status: SHIPPED | OUTPATIENT
Start: 2025-03-13

## 2025-03-13 RX ORDER — AZITHROMYCIN 250 MG/1
250 TABLET, FILM COATED ORAL DAILY
Qty: 4 TABLET | Refills: 0 | Status: SHIPPED | OUTPATIENT
Start: 2025-03-13 | End: 2025-03-13

## 2025-03-13 RX ORDER — ACETAMINOPHEN 325 MG/1
650 TABLET ORAL ONCE
Status: COMPLETED | OUTPATIENT
Start: 2025-03-13 | End: 2025-03-13

## 2025-03-13 RX ORDER — CODEINE PHOSPHATE AND GUAIFENESIN 10; 100 MG/5ML; MG/5ML
1-2 SOLUTION ORAL EVERY 4 HOURS PRN
Qty: 118 ML | Refills: 0 | Status: SHIPPED | OUTPATIENT
Start: 2025-03-13

## 2025-03-13 RX ADMIN — SODIUM CHLORIDE 1000 ML: 0.9 INJECTION, SOLUTION INTRAVENOUS at 00:24

## 2025-03-13 RX ADMIN — CEFTRIAXONE SODIUM 2 G: 2 INJECTION, POWDER, FOR SOLUTION INTRAMUSCULAR; INTRAVENOUS at 01:24

## 2025-03-13 RX ADMIN — AZITHROMYCIN DIHYDRATE 500 MG: 250 TABLET, FILM COATED ORAL at 01:24

## 2025-03-13 RX ADMIN — ACETAMINOPHEN 650 MG: 325 TABLET ORAL at 00:31

## 2025-03-13 RX ADMIN — IPRATROPIUM BROMIDE AND ALBUTEROL SULFATE 3 ML: .5; 3 SOLUTION RESPIRATORY (INHALATION) at 00:27

## 2025-03-13 ASSESSMENT — ACTIVITIES OF DAILY LIVING (ADL): ADLS_ACUITY_SCORE: 41

## 2025-03-13 NOTE — ED PROVIDER NOTES
EMERGENCY DEPARTMENT ENCOUNTER      NAME: Shahnaz Arias  AGE: 64 year old female  YOB: 1960  MRN: [id number]  EVALUATION DATE & TIME: No admission date for patient encounter.    PCP: Carmel Anders    ED PROVIDER: Soto Phipps M.D.    Chief Complaint   Patient presents with    Shortness of Breath    Flu Symptoms       FINAL IMPRESSION:  1. Community acquired bacterial pneumonia        ED COURSE & MEDICAL DECISION MAKING:    Pertinent Labs & Imaging studies independently interpreted by me. (See chart for details)  Reviewed most recent primary care visit March 2024 which was for routine physical, declined vaccinations at that time, no specific concerns, on lisinopril 10 mg daily  ED Course as of 03/13/25 0124   Wed Mar 12, 2025   2334 Chest x-ray independently interpreted by me negative for acute finding   Thu Mar 13, 2025   0004 Patient seen and examined, presents with about a week of cough, shortness of breath, chest tightness.  Fever started today.  Some sinus congestion.  Denies sore throat, nausea, vomiting, diarrhea, leg swelling.  On exam here, patient is tachycardic, diffuse expiratory wheezes and crackles bilaterally.  Chest x-ray negative but clinical presentation is most consistent with pneumonia with crackles in the lungs.  This could be a viral pneumonia and so we will defer antibiotics until fluid and COVID tests are done.  Nebulizer treatment, fluids ordered.   0039 Labs independently interpreted by me within normal CBC, negative respiratory panel.  Rocephin and azithromycin are ordered for community-acquired pneumonia and anticipate discharge on cefpodoxime and azithromycin   0053 Labs independently interpreted by me with reassuring basic panel.  Patient be given first dose of antibiotics in the emergency department and stable for discharge.   0123 Patient rechecked, remains limited tachycardic although this is improved after fluids, also reexamined lungs, improved air movement  after nebulizer treatment.  Discussed plan for discharge, patient is agreeable.     At the conclusion of the encounter I discussed the results of all of the tests and the disposition. The questions were answered. The patient or family acknowledged understanding and was agreeable with the care plan.     Medical Decision Making  Obtained supplemental history:Supplemental history obtained?: Family Member/Significant Other  Reviewed external records: External records reviewed?: Documented in chart  Care impacted by chronic illness:Hypertension  Did you consider but not order tests?: Work up considered but not performed and documented in chart, if applicable  Did you interpret images independently?: Independent interpretation of ECG and images noted in documentation, when applicable.  Consultation discussion with other provider:Did you involve another provider (consultant, , pharmacy, etc.)?: No  Discharge. I prescribed additional prescription strength medication(s) as charted. I considered admission, but discharged patient after significant clinical improvement.    MIPS (CTPE, Dental pain, [place], Sinusitis, Asthma/COPD, Head Trauma): Not Applicable    MEDICATIONS GIVEN IN THE EMERGENCY:  Medications   cefTRIAXone (ROCEPHIN) 2 g vial to attach to  ml bag for ADULTS or NS 50 ml bag for PEDS (has no administration in time range)   azithromycin (ZITHROMAX) tablet 500 mg (has no administration in time range)   sodium chloride 0.9% BOLUS 1,000 mL (1,000 mLs Intravenous $New Bag 3/13/25 0024)   ipratropium - albuterol 0.5 mg/2.5 mg/3 mL (DUONEB) neb solution 3 mL (3 mLs Nebulization $Given 3/13/25 0027)   acetaminophen (TYLENOL) tablet 650 mg (650 mg Oral $Given 3/13/25 0031)       NEW PRESCRIPTIONS STARTED AT TODAY'S ER VISIT  New Prescriptions    ALBUTEROL (PROAIR HFA/PROVENTIL HFA/VENTOLIN HFA) 108 (90 BASE) MCG/ACT INHALER    Inhale 2 puffs into the lungs every 4 hours as needed.    AZITHROMYCIN (ZITHROMAX Z-MECHE)  250 MG TABLET    Take 1 tablet (250 mg) by mouth daily.    CEFPODOXIME (VANTIN) 200 MG TABLET    Take 1 tablet (200 mg) by mouth 2 times daily.    GUAIFENESIN-CODEINE (ROBITUSSIN AC) 100-10 MG/5ML SOLUTION    Take 5-10 mLs by mouth every 4 hours as needed for cough.       =================================================================    HPI    Patient information was obtained from: patient, spouse      Shahnaz Arias is a 64 year old female with a pertinent history of hypertension who presents to this ED for evaluation of cough and fever.  Patient notes cough for about a week, shortness of breath, fatigue, body aches.  Also nasal congestion.  Fever today to 101.  Denies chest pain although has some tightness with breathing.  No nausea, vomiting, diarrhea.  Denies lower extremity swelling.  No known ill contacts      REVIEW OF SYSTEMS   Review of Systems   All other systems reviewed and negative    PAST MEDICAL HISTORY:  Past Medical History:   Diagnosis Date    Arthropathy, multiple sites     Created by Conversion  Replacement Utility updated for latest IMO load       PAST SURGICAL HISTORY:  Past Surgical History:   Procedure Laterality Date    DILATION AND CURETTAGE      HYSTERECTOMY  01/01/2002    VEIN LIGATION AND STRIPPING Bilateral     ZZC TOTAL ABDOM HYSTERECTOMY      Description: Total Abdominal Hysterectomy;  Recorded: 03/28/2013;       CURRENT MEDICATIONS:    Current Facility-Administered Medications   Medication Dose Route Frequency Provider Last Rate Last Admin    azithromycin (ZITHROMAX) tablet 500 mg  500 mg Oral Once Soto Phipps MD        cefTRIAXone (ROCEPHIN) 2 g vial to attach to  ml bag for ADULTS or NS 50 ml bag for PEDS  2 g Intravenous Once Soto Phipps MD         Current Outpatient Medications   Medication Sig Dispense Refill    albuterol (PROAIR HFA/PROVENTIL HFA/VENTOLIN HFA) 108 (90 Base) MCG/ACT inhaler Inhale 2 puffs into the lungs every 4 hours as needed. 18 g 0     azithromycin (ZITHROMAX Z-MECHE) 250 MG tablet Take 1 tablet (250 mg) by mouth daily. 4 tablet 0    cefpodoxime (VANTIN) 200 MG tablet Take 1 tablet (200 mg) by mouth 2 times daily. 14 tablet 0    guaiFENesin-codeine (ROBITUSSIN AC) 100-10 MG/5ML solution Take 5-10 mLs by mouth every 4 hours as needed for cough. 118 mL 0    ascorbic acid, vitamin C, (ASCORBIC ACID WITH KEYA HIPS) 500 MG tablet [ASCORBIC ACID, VITAMIN C, (ASCORBIC ACID WITH KEYA HIPS) 500 MG TABLET] Take 500 mg by mouth daily.      calcium carbonate-vitamin D3 (CALCIUM 600 WITH VITAMIN D3) 600 mg(1,500mg) -200 unit per tablet [CALCIUM CARBONATE-VITAMIN D3 (CALCIUM 600 WITH VITAMIN D3) 600 MG(1,500MG) -200 UNIT PER TABLET] Take 1 tablet by mouth daily.      coenzyme Q10 (CO Q-10) 100 mg capsule [COENZYME Q10 (CO Q-10) 100 MG CAPSULE] Take 100 mg by mouth daily. Take as directed.      cranberry 400 mg cap [CRANBERRY 400 MG CAP] Take 1 tablet by mouth daily.      lisinopril (ZESTRIL) 10 MG tablet Take 1 tablet (10 mg) by mouth daily 90 tablet 0    Omega-3 Fatty Acids (RA FISH OIL) 1000 MG CAPS          ALLERGIES:  Allergies   Allergen Reactions    Amoxicillin Rash     Fever    Sulfa Antibiotics Unknown and Rash       FAMILY HISTORY:  Family History   Problem Relation Age of Onset    Dementia Mother         Alzheimer    Varicose Veins Mother     Chronic Obstructive Pulmonary Disease Father     Varicose Veins Sister     Varicose Veins Brother     Breast Cancer Maternal Aunt     Dementia Maternal Aunt         Alzheimers    Breast Cancer Maternal Aunt     Dementia Maternal Aunt         Alzheimers       SOCIAL HISTORY:   Social History     Socioeconomic History    Marital status:    Tobacco Use    Smoking status: Never    Smokeless tobacco: Never   Substance and Sexual Activity    Alcohol use: No    Drug use: Never    Sexual activity: Yes     Partners: Male     Birth control/protection: None     Social Drivers of Health     Financial Resource Strain:  "Low Risk  (3/7/2024)    Financial Resource Strain     Within the past 12 months, have you or your family members you live with been unable to get utilities (heat, electricity) when it was really needed?: No   Food Insecurity: Low Risk  (3/7/2024)    Food Insecurity     Within the past 12 months, did you worry that your food would run out before you got money to buy more?: No     Within the past 12 months, did the food you bought just not last and you didn t have money to get more?: No   Transportation Needs: Low Risk  (3/7/2024)    Transportation Needs     Within the past 12 months, has lack of transportation kept you from medical appointments, getting your medicines, non-medical meetings or appointments, work, or from getting things that you need?: No   Physical Activity: Insufficiently Active (3/7/2024)    Exercise Vital Sign     Days of Exercise per Week: 3 days     Minutes of Exercise per Session: 40 min   Stress: No Stress Concern Present (3/7/2024)    [place] [place] [employer] - Occupational Stress Questionnaire     Feeling of Stress : Only a little   Social Connections: Unknown (3/7/2024)    Social Connection and Isolation Panel [NHANES]     Frequency of Social Gatherings with Friends and Family: Three times a week   Interpersonal Safety: Low Risk  (3/14/2024)    Interpersonal Safety     Do you feel physically and emotionally safe where you currently live?: Yes     Within the past 12 months, have you been hit, slapped, kicked or otherwise physically hurt by someone?: No     Within the past 12 months, have you been humiliated or emotionally abused in other ways by your partner or ex-partner?: No   Housing Stability: Low Risk  (3/7/2024)    Housing Stability     Do you have housing? : Yes     Are you worried about losing your housing?: No       VITALS:  /68   Pulse 112   Temp 99.6  F (37.6  C) (Oral)   Resp 22   Ht 1.676 m (5' 6\")   Wt 104.3 kg (230 lb)   SpO2 94%   BMI 37.12 kg/m  "     PHYSICAL EXAM:  Physical Exam  Vitals and nursing note reviewed.   Constitutional:       Appearance: Normal appearance.   HENT:      Head: Normocephalic and atraumatic.      Right Ear: External ear normal.      Left Ear: External ear normal.      Nose: Nose normal.      Mouth/Throat:      Mouth: Mucous membranes are moist.   Eyes:      Extraocular Movements: Extraocular movements intact.      Conjunctiva/sclera: Conjunctivae normal.      Pupils: Pupils are equal, round, and reactive to light.   Cardiovascular:      Rate and Rhythm: Regular rhythm. Tachycardia present.   Pulmonary:      Effort: Pulmonary effort is normal.      Breath sounds: Wheezing and rales present.      Comments: Bilateral expiratory wheezes and bilateral crackles  Abdominal:      General: Abdomen is flat. There is no distension.      Palpations: Abdomen is soft.      Tenderness: There is no abdominal tenderness. There is no guarding.   Musculoskeletal:         General: Normal range of motion.      Cervical back: Normal range of motion and neck supple.      Right lower leg: No edema.      Left lower leg: No edema.   Lymphadenopathy:      Cervical: No cervical adenopathy.   Skin:     General: Skin is warm and dry.   Neurological:      General: No focal deficit present.      Mental Status: She is alert and oriented to person, place, and time. Mental status is at baseline.      Comments: No gross focal neurologic deficits   Psychiatric:         Mood and Affect: Mood normal.         Behavior: Behavior normal.         Thought Content: Thought content normal.          LAB:  All pertinent labs reviewed and interpreted.  Results for orders placed or performed during the hospital encounter of 03/12/25   Chest XR,  PA & LAT    Impression    IMPRESSION:     Heart size is normal. Lungs are clear bilaterally. Mediastinum and visualized bony structures are unremarkable.    Influenza A/B, RSV and SARS-CoV2 PCR (COVID-19) Nasopharyngeal    Specimen:  Nasopharyngeal; Swab   Result Value Ref Range    Influenza A PCR Negative Negative    Influenza B PCR Negative Negative    RSV PCR Negative Negative    SARS CoV2 PCR Negative Negative   Basic metabolic panel   Result Value Ref Range    Sodium 138 135 - 145 mmol/L    Potassium 4.3 3.4 - 5.3 mmol/L    Chloride 104 98 - 107 mmol/L    Carbon Dioxide (CO2) 28 22 - 29 mmol/L    Anion Gap 6 (L) 7 - 15 mmol/L    Urea Nitrogen 17.6 8.0 - 23.0 mg/dL    Creatinine 0.80 0.51 - 0.95 mg/dL    GFR Estimate 83 >60 mL/min/1.73m2    Calcium 9.2 8.8 - 10.4 mg/dL    Glucose 122 (H) 70 - 99 mg/dL   Result Value Ref Range    Magnesium 1.8 1.7 - 2.3 mg/dL   CBC with platelets and differential   Result Value Ref Range    WBC Count 10.8 4.0 - 11.0 10e3/uL    RBC Count 4.14 3.80 - 5.20 10e6/uL    Hemoglobin 12.0 11.7 - 15.7 g/dL    Hematocrit 37.0 35.0 - 47.0 %    MCV 89 78 - 100 fL    MCH 29.0 26.5 - 33.0 pg    MCHC 32.4 31.5 - 36.5 g/dL    RDW 13.2 10.0 - 15.0 %    Platelet Count 229 150 - 450 10e3/uL    % Neutrophils 86 %    % Lymphocytes 7 %    % Monocytes 5 %    % Eosinophils 1 %    % Basophils 0 %    % Immature Granulocytes 0 %    NRBCs per 100 WBC 0 <1 /100    Absolute Neutrophils 9.3 (H) 1.6 - 8.3 10e3/uL    Absolute Lymphocytes 0.8 0.8 - 5.3 10e3/uL    Absolute Monocytes 0.6 0.0 - 1.3 10e3/uL    Absolute Eosinophils 0.1 0.0 - 0.7 10e3/uL    Absolute Basophils 0.0 0.0 - 0.2 10e3/uL    Absolute Immature Granulocytes 0.0 <=0.4 10e3/uL    Absolute NRBCs 0.0 10e3/uL       RADIOLOGY:  Reviewed all pertinent imaging. Please see official radiology report.  Chest XR,  PA & LAT   Final Result   IMPRESSION:       Heart size is normal. Lungs are clear bilaterally. Mediastinum and visualized bony structures are unremarkable.           Soto Phipps M.D.  Emergency Medicine  [facility]  [facility] [facility] [id number] [phone]  Dept:  [phone]       Soto Phipps MD  03/13/25 0124

## 2025-03-13 NOTE — DISCHARGE INSTRUCTIONS
Start cefpodoxime and azithromycin tonight (evening of March 13)    Tylenol and ibuprofen as needed for fever

## 2025-03-13 NOTE — ED TRIAGE NOTES
Shahnaz arrives to triage with . Patient has cough, shortness of breath, and chest congestion. Tachycardiac in triage. Temp 99.6, patient took fever reducing cold and cough 2 hours PTA. 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     Triage Assessment (Adult)       Row Name 03/12/25 3418          Triage Assessment    Airway WDL WDL        Respiratory WDL    Respiratory WDL X;rhythm/pattern     Rhythm/Pattern, Respiratory shortness of breath        Skin Circulation/Temperature WDL    Skin Circulation/Temperature WDL WDL        Cardiac WDL    Cardiac WDL X;rhythm

## 2025-04-15 ENCOUNTER — PATIENT OUTREACH (OUTPATIENT)
Dept: CARE COORDINATION | Facility: CLINIC | Age: 65
End: 2025-04-15
Payer: COMMERCIAL

## 2025-05-04 SDOH — HEALTH STABILITY: PHYSICAL HEALTH: ON AVERAGE, HOW MANY DAYS PER WEEK DO YOU ENGAGE IN MODERATE TO STRENUOUS EXERCISE (LIKE A BRISK WALK)?: 4 DAYS

## 2025-05-04 SDOH — HEALTH STABILITY: PHYSICAL HEALTH: ON AVERAGE, HOW MANY MINUTES DO YOU ENGAGE IN EXERCISE AT THIS LEVEL?: 20 MIN

## 2025-05-04 ASSESSMENT — SOCIAL DETERMINANTS OF HEALTH (SDOH): HOW OFTEN DO YOU GET TOGETHER WITH FRIENDS OR RELATIVES?: TWICE A WEEK

## 2025-05-07 ENCOUNTER — RESULTS FOLLOW-UP (OUTPATIENT)
Dept: FAMILY MEDICINE | Facility: CLINIC | Age: 65
End: 2025-05-07

## 2025-05-07 ENCOUNTER — OFFICE VISIT (OUTPATIENT)
Dept: FAMILY MEDICINE | Facility: CLINIC | Age: 65
End: 2025-05-07
Payer: COMMERCIAL

## 2025-05-07 VITALS
TEMPERATURE: 98.4 F | HEIGHT: 65 IN | SYSTOLIC BLOOD PRESSURE: 127 MMHG | HEART RATE: 75 BPM | RESPIRATION RATE: 16 BRPM | DIASTOLIC BLOOD PRESSURE: 84 MMHG | OXYGEN SATURATION: 99 % | BODY MASS INDEX: 39.99 KG/M2 | WEIGHT: 240 LBS

## 2025-05-07 DIAGNOSIS — Z12.11 SCREEN FOR COLON CANCER: Primary | ICD-10-CM

## 2025-05-07 DIAGNOSIS — I10 BENIGN ESSENTIAL HYPERTENSION: ICD-10-CM

## 2025-05-07 DIAGNOSIS — Z79.899 MEDICATION MANAGEMENT: ICD-10-CM

## 2025-05-07 DIAGNOSIS — E78.2 MIXED HYPERLIPIDEMIA: ICD-10-CM

## 2025-05-07 DIAGNOSIS — Z12.31 ENCOUNTER FOR SCREENING MAMMOGRAM FOR BREAST CANCER: ICD-10-CM

## 2025-05-07 DIAGNOSIS — Z13.1 DIABETES MELLITUS SCREENING: ICD-10-CM

## 2025-05-07 DIAGNOSIS — E66.01 CLASS 2 SEVERE OBESITY WITH SERIOUS COMORBIDITY AND BODY MASS INDEX (BMI) OF 39.0 TO 39.9 IN ADULT, UNSPECIFIED OBESITY TYPE (H): ICD-10-CM

## 2025-05-07 DIAGNOSIS — Z00.00 ENCOUNTER FOR ROUTINE HISTORY AND PHYSICAL EXAM IN FEMALE: ICD-10-CM

## 2025-05-07 DIAGNOSIS — E66.812 CLASS 2 SEVERE OBESITY WITH SERIOUS COMORBIDITY AND BODY MASS INDEX (BMI) OF 39.0 TO 39.9 IN ADULT, UNSPECIFIED OBESITY TYPE (H): ICD-10-CM

## 2025-05-07 LAB
ALBUMIN SERPL BCG-MCNC: 4.2 G/DL (ref 3.5–5.2)
ALBUMIN UR-MCNC: NEGATIVE MG/DL
ALP SERPL-CCNC: 98 U/L (ref 40–150)
ALT SERPL W P-5'-P-CCNC: 16 U/L (ref 0–50)
APPEARANCE UR: CLEAR
AST SERPL W P-5'-P-CCNC: 26 U/L (ref 0–45)
BILIRUB DIRECT SERPL-MCNC: 0.31 MG/DL (ref 0–0.3)
BILIRUB SERPL-MCNC: 0.9 MG/DL
BILIRUB UR QL STRIP: NEGATIVE
CHOLEST SERPL-MCNC: 207 MG/DL
COLOR UR AUTO: YELLOW
EST. AVERAGE GLUCOSE BLD GHB EST-MCNC: 114 MG/DL
FASTING STATUS PATIENT QL REPORTED: ABNORMAL
GLUCOSE UR STRIP-MCNC: NEGATIVE MG/DL
HBA1C MFR BLD: 5.6 % (ref 0–5.6)
HDLC SERPL-MCNC: 58 MG/DL
HGB UR QL STRIP: NEGATIVE
KETONES UR STRIP-MCNC: NEGATIVE MG/DL
LDLC SERPL CALC-MCNC: 127 MG/DL
LEUKOCYTE ESTERASE UR QL STRIP: NEGATIVE
NITRATE UR QL: NEGATIVE
NONHDLC SERPL-MCNC: 149 MG/DL
PH UR STRIP: 7 [PH] (ref 5–8)
PROT SERPL-MCNC: 7.6 G/DL (ref 6.4–8.3)
SP GR UR STRIP: 1.01 (ref 1–1.03)
TRIGL SERPL-MCNC: 111 MG/DL
TSH SERPL DL<=0.005 MIU/L-ACNC: 1.55 UIU/ML (ref 0.3–4.2)
UROBILINOGEN UR STRIP-ACNC: 0.2 E.U./DL

## 2025-05-07 PROCEDURE — 90471 IMMUNIZATION ADMIN: CPT | Performed by: NURSE PRACTITIONER

## 2025-05-07 PROCEDURE — 36415 COLL VENOUS BLD VENIPUNCTURE: CPT | Performed by: NURSE PRACTITIONER

## 2025-05-07 PROCEDURE — 80076 HEPATIC FUNCTION PANEL: CPT | Performed by: NURSE PRACTITIONER

## 2025-05-07 PROCEDURE — 1125F AMNT PAIN NOTED PAIN PRSNT: CPT | Performed by: NURSE PRACTITIONER

## 2025-05-07 PROCEDURE — 3074F SYST BP LT 130 MM HG: CPT | Performed by: NURSE PRACTITIONER

## 2025-05-07 PROCEDURE — 80061 LIPID PANEL: CPT | Performed by: NURSE PRACTITIONER

## 2025-05-07 PROCEDURE — 84443 ASSAY THYROID STIM HORMONE: CPT | Performed by: NURSE PRACTITIONER

## 2025-05-07 PROCEDURE — 90677 PCV20 VACCINE IM: CPT | Performed by: NURSE PRACTITIONER

## 2025-05-07 PROCEDURE — G2211 COMPLEX E/M VISIT ADD ON: HCPCS | Performed by: NURSE PRACTITIONER

## 2025-05-07 PROCEDURE — 81003 URINALYSIS AUTO W/O SCOPE: CPT | Performed by: NURSE PRACTITIONER

## 2025-05-07 PROCEDURE — 99214 OFFICE O/P EST MOD 30 MIN: CPT | Mod: 25 | Performed by: NURSE PRACTITIONER

## 2025-05-07 PROCEDURE — 99396 PREV VISIT EST AGE 40-64: CPT | Mod: 25 | Performed by: NURSE PRACTITIONER

## 2025-05-07 PROCEDURE — 83036 HEMOGLOBIN GLYCOSYLATED A1C: CPT | Performed by: NURSE PRACTITIONER

## 2025-05-07 PROCEDURE — 3079F DIAST BP 80-89 MM HG: CPT | Performed by: NURSE PRACTITIONER

## 2025-05-07 RX ORDER — LISINOPRIL 10 MG/1
10 TABLET ORAL DAILY
Qty: 90 TABLET | Refills: 3 | Status: SHIPPED | OUTPATIENT
Start: 2025-05-07

## 2025-05-07 ASSESSMENT — PAIN SCALES - GENERAL: PAINLEVEL_OUTOF10: MODERATE PAIN (5)

## 2025-05-07 NOTE — PROGRESS NOTES
Assessment and Plan:    Encounter for routine history and physical exam in female  Recommend consuming a healthy diet and exercising.  And pneumococcal vaccine provided.  She will consider obtaining the shingles vaccine in the future.  She declines COVID-vaccine.  Colonoscopy and mammogram ordered.  - TSH with free T4 reflex  - UA Macroscopic with reflex to Microscopic and Culture    Screen for colon cancer  - Colonoscopy Screening  Referral    Encounter for screening mammogram for breast cancer  - MA Screen Bilateral w/Stone    Diabetes mellitus screening  - Hemoglobin A1c    Benign essential hypertension  This is controlled.  She continues lisinopril.  - lisinopril (ZESTRIL) 10 MG tablet  Dispense: 90 tablet; Refill: 3    Mixed hyperlipidemia  She has a history of mildly elevated LDL.  Will check lipid cascade.  The 10-year ASCVD risk score (Lillian REYES, et al., 2019) is: 5.2%    Values used to calculate the score:      Age: 65 years      Sex: Female      Is Non- : No      Diabetic: No      Tobacco smoker: No      Systolic Blood Pressure: 127 mmHg      Is BP treated: Yes      HDL Cholesterol: 65 mg/dL      Total Cholesterol: 184 mg/dL   - Lipid panel reflex to direct LDL Fasting    Class 2 severe obesity with serious comorbidity and body mass index (BMI) of 39.0 to 39.9 in adult, unspecified obesity type (H)  Recommend consuming a healthy diet and exercising.  This is contributing to hypertension and hyperlipidemia.    Medication management  - Hepatic panel (Albumin, ALT, AST, Bili, Alk Phos, TP)    The longitudinal plan of care for the diagnosis(es)/condition(s) as documented were addressed during this visit. Due to the added complexity in care, I will continue to support Shahnaz in the subsequent management and with ongoing continuity of care.      Subjective:     Shahnaz is a 65 year old female presenting to the clinic for a female physical.     LMP: hysterectomy at age 42 for  menorrhagia   Hx of abnormal pap smear: none   Last pap smear: 2010   Perform self-breast exams: yes   Vaginal discharge or irritation: none   Sexually active:  since 1998   Contraception: none   Concerns for STDs: none   Previous pregnancies:two pregnancies (vaginal deliveries)  Kids are 43 and 37 (both girls)   Two grandchildren, two step grandchildren      Patient has hypertension which is controlled lisinopril 10 mg daily.  She denies headaches, blurry vision, chest pain, shortness of breath with exertion, edema, orthopnea, syncope. She wears compression stockings for bilateral lower extremity edema.  She has a history of mild hyperlipidemia.  Last cholesterol check was on 3/14/2024 with a total cholesterol 184, triglycerides 55, HDL 65, .    Review of systems:  I performed a 10 point review of systems.  All pertinent positives and negatives are noted in the HPI. All others are negative.     Allergies   Allergen Reactions    Amoxicillin Rash     Fever    Sulfa Antibiotics Unknown and Rash       Current Outpatient Medications   Medication Sig Dispense Refill    albuterol (PROAIR HFA/PROVENTIL HFA/VENTOLIN HFA) 108 (90 Base) MCG/ACT inhaler Inhale 2 puffs into the lungs every 4 hours as needed. 18 g 0    ascorbic acid, vitamin C, (ASCORBIC ACID WITH KEYA HIPS) 500 MG tablet [ASCORBIC ACID, VITAMIN C, (ASCORBIC ACID WITH KEYA HIPS) 500 MG TABLET] Take 500 mg by mouth daily.      calcium carbonate-vitamin D3 (CALCIUM 600 WITH VITAMIN D3) 600 mg(1,500mg) -200 unit per tablet [CALCIUM CARBONATE-VITAMIN D3 (CALCIUM 600 WITH VITAMIN D3) 600 MG(1,500MG) -200 UNIT PER TABLET] Take 1 tablet by mouth daily.      coenzyme Q10 (CO Q-10) 100 mg capsule [COENZYME Q10 (CO Q-10) 100 MG CAPSULE] Take 100 mg by mouth daily. Take as directed.      cranberry 400 mg cap [CRANBERRY 400 MG CAP] Take 1 tablet by mouth daily.      lisinopril (ZESTRIL) 10 MG tablet Take 1 tablet (10 mg) by mouth daily. 90 tablet 0    Omega-3  Fatty Acids (RA FISH OIL) 1000 MG CAPS        No current facility-administered medications for this visit.       Social History     Socioeconomic History    Marital status:      Spouse name: Not on file    Number of children: Not on file    Years of education: Not on file    Highest education level: Not on file   Occupational History    Not on file   Tobacco Use    Smoking status: Never     Passive exposure: Never    Smokeless tobacco: Never   Vaping Use    Vaping status: Never Used   Substance and Sexual Activity    Alcohol use: No    Drug use: Never    Sexual activity: Yes     Partners: Male     Birth control/protection: None   Other Topics Concern    Not on file   Social History Narrative    Not on file     Social Drivers of Health     Financial Resource Strain: Low Risk  (5/4/2025)    Financial Resource Strain     Within the past 12 months, have you or your family members you live with been unable to get utilities (heat, electricity) when it was really needed?: No   Food Insecurity: Low Risk  (5/4/2025)    Food Insecurity     Within the past 12 months, did you worry that your food would run out before you got money to buy more?: No     Within the past 12 months, did the food you bought just not last and you didn t have money to get more?: No   Transportation Needs: Low Risk  (5/4/2025)    Transportation Needs     Within the past 12 months, has lack of transportation kept you from medical appointments, getting your medicines, non-medical meetings or appointments, work, or from getting things that you need?: No   Physical Activity: Insufficiently Active (5/4/2025)    Exercise Vital Sign     Days of Exercise per Week: 4 days     Minutes of Exercise per Session: 20 min   Stress: No Stress Concern Present (5/4/2025)    [place] [employer] - Occupational Stress Questionnaire     Feeling of Stress : Only a little   Social Connections: Unknown (5/4/2025)    Social Connection and Isolation  "Panel [NHANES]     Frequency of Communication with Friends and Family: Not on file     Frequency of Social Gatherings with Friends and Family: Twice a week     Attends Faith Services: Not on file     Active Member of Clubs or Organizations: Not on file     Attends Club or Organization Meetings: Not on file     Marital Status: Not on file   Interpersonal Safety: Low Risk  (5/7/2025)    Interpersonal Safety     Do you feel physically and emotionally safe where you currently live?: Yes     Within the past 12 months, have you been hit, slapped, kicked or otherwise physically hurt by someone?: No     Within the past 12 months, have you been humiliated or emotionally abused in other ways by your partner or ex-partner?: No   Housing Stability: Low Risk  (5/4/2025)    Housing Stability     Do you have housing? : Yes     Are you worried about losing your housing?: No       Past Medical History:   Diagnosis Date    Arthropathy, multiple sites     Created by Conversion  Replacement Utility updated for latest IMO load       Family History   Problem Relation Age of Onset    Dementia Mother         Alzheimer    Varicose Veins Mother     Chronic Obstructive Pulmonary Disease Father     Varicose Veins Sister     Varicose Veins Brother     Breast Cancer Maternal Aunt     Dementia Maternal Aunt         Alzheimers    Breast Cancer Maternal Aunt     Dementia Maternal Aunt         Alzheimers       Past Surgical History:   Procedure Laterality Date    DILATION AND CURETTAGE      HYSTERECTOMY  01/01/2002    VEIN LIGATION AND STRIPPING Bilateral     ZZC TOTAL ABDOM HYSTERECTOMY      Description: Total Abdominal Hysterectomy;  Recorded: 03/28/2013;       Objective:     /84   Pulse 75   Temp 98.4  F (36.9  C)   Resp 16   Ht 1.651 m (5' 5\")   Wt 108.9 kg (240 lb)   SpO2 99%   BMI 39.94 kg/m      Patient is alert, no obvious distress.   Skin: Warm, dry.  No rashes or lesions. Skin turgor rapid return.   HEENT:  Eyes normal.  " Ears normal.  Nose patent, mucosa pink.  Oropharynx mucosa pink, no lesions or tonsil enlargement.   Neck:  Supple, without lymphadenopathy, bruits, JVD. Thyroid normal texture and size.    Lungs:  Clear to auscultation.  No wheezing, rales noted.  Respirations even and unlabored.   Heart:  Regular rate and rhythm.  No murmurs.   Breasts:  Normal.  No surrounding adenopathy.   Abdomen: Soft, nontender.  No organomegaly.  Bowel sounds normoactive.  No guarding or masses noted.   :  deferred   Musculoskeletal:  Full ROM of extremities.  Muscle strength equal +5/5.   Neurological:  Cranial nerves 2-12 intact.

## 2025-05-08 ENCOUNTER — PATIENT OUTREACH (OUTPATIENT)
Dept: CARE COORDINATION | Facility: CLINIC | Age: 65
End: 2025-05-08
Payer: COMMERCIAL

## 2025-06-19 ENCOUNTER — TRANSFERRED RECORDS (OUTPATIENT)
Dept: ADMINISTRATIVE | Facility: CLINIC | Age: 65
End: 2025-06-19
Payer: COMMERCIAL

## 2025-06-23 PROBLEM — D12.6 ADENOMATOUS POLYP OF COLON: Status: ACTIVE | Noted: 2025-06-23

## 2025-06-23 NOTE — PROCEDURES
[facility]   [address], [address], [place] [address]     Patient Name: Shahnaz Arias  Gender:  Female  Exam Date: 06/19/2025 Visit Number:  [id number]  Age: 65 Years YOB: 1960  Attending MD: Сергей Oh MD Medical Record#:  [id number]    Procedure: Colonoscopy   Indications: Colorectal cancer screening      Referring MD: Carmel Anders CNP  Primary MD:      Carmel Anders CNP  Medications: Admitting Medications:   0.9% Normal Saline at TKO   Intra Procedure Medications:   Patient received monitored anesthesia care.     Complications: No immediate complications  ______________________________________________________________________________  Procedure:   An examination of the heart and lungs was performed and found to be within acceptable limits.  .  The patient was therefore deemed a reasonable candidate for endoscopy and sedation.   The risks and benefits of the procedure were explained to the patient.After obtaining informed consent, the patient received monitored anesthesia care and I passed the scope   without difficulty via the rectum to the cecum.  The appendiceal orifice and ic valve were identified.  The scope was retroflexed during the examination  The quality of the prep was good  (Miralax/Gatorade/2 tablets Bisacodyl/Magnesium Citrate).    This was a complete examination throughout the entire colon.    Findings:    Polyp location: transverse colon.  Quantity: 3.  Size:  5 mm, 4 mm.  Polyp shape:  sessile.         Maneuver: polypectomy was performed with a cold biopsy forceps.       Removal:  complete.  Retrieval: complete.  Bleeding: none.    Diverticulosis.  Location: - descending colon - sigmoid.        Quantity:  several.    Impression:  Screening Colonoscopy  Colorectal polyp detected on colonoscopy    Preliminary Plan:  The patient and their physician will receive a copy of the pathology report as well as pathology-based recommendations for future  screening or surveillance.  Pathology Results:  A: COLON, TRANSVERSE, POLYPS:           1. Tubular adenomas (2) and normal colonic mucosa (endoscopically 3 polyps)           2. Negative for high grade dysplasia           3. Per the colonoscopy report:               a. Polyp sizes: 4 mm - 5 mm               b. Resection: Complete               c. Retrieval: Complete      MICROSCOPIC  A: Performed   SPECIAL STAINING/DEEPER  A: Deeper     Electronically signed by: Aditya Rea MD    Interpreted at [facility], [address] [place] [id number]    Orders    Instruction(s)/Education:  Instruction/Education Timeframe Assessment   Colon Polyps  K63.5   Diverticulosis/Diverticulitis  K63.5       Final Plan:  Repeat colonoscopy in 5 years.    We will attempt to contact you at appropriate intervals via [place] mail.  We may not be able to find you or contact you at that time, therefore you should know that the responsibility for following our recommendation rests with you.  If you don't hear from us at the time your procedure is due, please contact our office to schedule an appointment.  If your contact information should change, please contact our office so that we can update your record.      _Electronically signed by:___________________  Сергей Oh MD                 06/19/2025    cc: Carmel Anders CNP  cc: Carmel Anders CNP

## 2025-06-24 ENCOUNTER — PATIENT OUTREACH (OUTPATIENT)
Dept: GASTROENTEROLOGY | Facility: CLINIC | Age: 65
End: 2025-06-24
Payer: COMMERCIAL

## 2025-07-11 NOTE — TELEPHONE ENCOUNTER
Spoke with patient regarding vein ablation procedure next week. Advised to bring a . Explained it is ok to eat and drink prior to procedure with exception of blood thinner. Verified patient's insurance[employer]. We will supply patient with a thigh high compression sock. We carry from size medium to extra large. If patient doesn't fit into them they will need to provide their own. Questions answered. Patient will call if any further questions.   PAST MEDICAL HISTORY:  Closed nondisplaced fracture of distal phalanx of right thumb     Loud snoring     Situs inversus with dextrocardia     Sleep apnea, unspecified     Supernumerary spleen